# Patient Record
Sex: FEMALE | Race: WHITE | Employment: UNEMPLOYED | URBAN - METROPOLITAN AREA
[De-identification: names, ages, dates, MRNs, and addresses within clinical notes are randomized per-mention and may not be internally consistent; named-entity substitution may affect disease eponyms.]

---

## 2017-05-09 ENCOUNTER — OFFICE VISIT (OUTPATIENT)
Dept: FAMILY MEDICINE CLINIC | Age: 42
End: 2017-05-09

## 2017-05-09 VITALS
TEMPERATURE: 98 F | HEIGHT: 64 IN | HEART RATE: 83 BPM | BODY MASS INDEX: 27.01 KG/M2 | SYSTOLIC BLOOD PRESSURE: 110 MMHG | WEIGHT: 158.2 LBS | RESPIRATION RATE: 16 BRPM | DIASTOLIC BLOOD PRESSURE: 74 MMHG | OXYGEN SATURATION: 97 %

## 2017-05-09 DIAGNOSIS — R73.01 IMPAIRED FASTING BLOOD SUGAR: ICD-10-CM

## 2017-05-09 DIAGNOSIS — E04.1 THYROID CYST: ICD-10-CM

## 2017-05-09 DIAGNOSIS — E04.9 ENLARGED THYROID GLAND: ICD-10-CM

## 2017-05-09 DIAGNOSIS — F41.9 ANXIETY: Primary | ICD-10-CM

## 2017-05-09 DIAGNOSIS — Z13.220 SCREENING FOR HYPERLIPIDEMIA: ICD-10-CM

## 2017-05-09 RX ORDER — ALPRAZOLAM 0.25 MG/1
TABLET ORAL
Qty: 10 TAB | Refills: 0 | Status: SHIPPED | OUTPATIENT
Start: 2017-05-09 | End: 2018-02-08

## 2017-05-09 NOTE — MR AVS SNAPSHOT
Visit Information Date & Time Provider Department Dept. Phone Encounter #  
 5/9/2017  8:45 AM Benson Sal, [address] [id number] Follow-up Instructions Return in about 6 months (around 11/9/2017). Your Appointments 6/14/2017 10:00 AM  
ROUTINE CARE with Benson Sal MD  
[facility] ([address]) Appt Note: 6 mo fu  
 [address] [facility] [address]  
[address] [facility] [place] [address] [facility] [address] Upcoming Health Maintenance Date Due INFLUENZA AGE 9 TO ADULT 8/1/2017 PAP AKA CERVICAL CYTOLOGY 12/1/2018 DTaP/Tdap/Td series (2 - Td) 10/4/2023 Allergies as of 5/9/2017  Review Complete On: 5/9/2017 By: Benson Sal MD  
 No Known Allergies Current Immunizations  Reviewed on 12/7/2016 Name Date Influenza Vaccine (Quad) PF 12/7/2016 TB Skin Test (PPD) Intradermal 10/4/2013 10:43 AM  
 Tdap 10/4/2013 Not reviewed this visit You Were Diagnosed With   
  
 Codes Comments Anxiety    -  Primary ICD-10-CM: F41.9 ICD-9-CM: 300.00 Thyroid cyst     ICD-10-CM: E04.1 ICD-9-CM: 246.2 Enlarged thyroid gland     ICD-10-CM: E01.0 ICD-9-CM: 240.9 Impaired fasting blood sugar     ICD-10-CM: R73.01 
ICD-9-CM: 790.21 Screening for hyperlipidemia     ICD-10-CM: Z13.220 ICD-9-CM: V77.91 Vitals BP Pulse Temp Resp Height(growth percentile) Weight(growth percentile) 110/74 (BP 1 Location: Left arm, BP Patient Position: Sitting) 83 98 °F (36.7 °C) (Oral) 16 5' 4\" (1.626 m) 158 lb 3.2 oz (71.8 kg) SpO2 BMI OB Status Smoking Status 97% 27.15 kg/m2 Ablation Never Smoker Vitals History BMI and BSA Data Body Mass Index Body Surface Area  
 27.15 kg/m 2 1.8 m 2 Preferred Pharmacy Pharmacy Name Phone  [phone] - [place] [address] [address] [facility] [address] [facility] Your Updated Medication List  
  
   
This list is accurate as of: 17  9:27 AM.  Always use your most recent med list.  
  
  
  
  
 ALPRAZolam 0.25 mg tablet Commonly known as:  XANAX  
1 tab 15-30 minutes before flying. cetirizine 10 mg tablet Commonly known as:  ZYRTEC Take 10 mg by mouth daily as needed for Allergies. ibuprofen 200 mg tablet Commonly known as:  MOTRIN Take  by mouth as needed for Pain. Prescriptions Printed Refills ALPRAZolam (XANAX) 0.25 mg tablet 0 Si tab 15-30 minutes before flying. Class: Print Follow-up Instructions Return in about 6 months (around 2017). To-Do List   
 2017 Lab:  HEMOGLOBIN A1C WITH EAG   
  
 2017 Lab:  LIPID PANEL   
  
 2017 Lab:  T4, FREE   
  
 2017 Lab:  THYROID ANTIBODY PANEL   
  
 2017 Lab:  TSH 3RD GENERATION   
  
 2017 Imaging:  US THYROID/PARATHYROID/SOFT TISS Patient Instructions Anxiety Disorder: Care Instructions Your Care Instructions Anxiety is a normal reaction to stress. Difficult situations can cause you to have symptoms such as sweaty palms and a nervous feeling. In an anxiety disorder, the symptoms are far more severe. Constant worry, muscle tension, trouble sleeping, nausea and diarrhea, and other symptoms can make normal daily activities difficult or impossible. These symptoms may occur for no reason, and they can affect your work, school, or social life. Medicines, counseling, and self-care can all help. Follow-up care is a key part of your treatment and safety. Be sure to make and go to all appointments, and call your doctor if you are having problems. It's also a good idea to know your test results and keep a list of the medicines you take. How can you care for yourself at home? · Take medicines exactly as directed. Call your doctor if you think you are having a problem with your medicine. · Go to your counseling sessions and follow-up appointments. · Recognize and accept your anxiety. Then, when you are in a situation that makes you anxious, say to yourself, \"This is not an emergency. I feel uncomfortable, but I am not in danger. I can keep going even if I feel anxious. \" · Be kind to your body: ¨ Relieve tension with exercise or a massage. ¨ Get enough rest. 
¨ Avoid alcohol, caffeine, nicotine, and illegal drugs. They can increase your anxiety level and cause sleep problems. ¨ Learn and do relaxation techniques. See below for more about these techniques. · Engage your mind. Get out and do something you enjoy. Go to a funny movie, or take a walk or hike. Plan your day. Having too much or too little to do can make you anxious. · Keep a record of your symptoms. Discuss your fears with a good friend or family member, or join a support group for people with similar problems. Talking to others sometimes relieves stress. · Get involved in social groups, or volunteer to help others. Being alone sometimes makes things seem worse than they are. · Get at least 30 minutes of exercise on most days of the week to relieve stress. Walking is a good choice. You also may want to do other activities, such as running, swimming, cycling, or playing tennis or team sports. Relaxation techniques Do relaxation exercises 10 to 20 minutes a day. You can play soothing, relaxing music while you do them, if you wish. · Tell others in your house that you are going to do your relaxation exercises. Ask them not to disturb you. · Find a comfortable place, away from all distractions and noise. · Lie down on your back, or sit with your back straight. · Focus on your breathing. Make it slow and steady. · Breathe in through your nose. Breathe out through either your nose or mouth. · Breathe deeply, filling up the area between your navel and your rib cage. Breathe so that your belly goes up and down. · Do not hold your breath. · Breathe like this for 5 to 10 minutes. Notice the feeling of calmness throughout your whole body. As you continue to breathe slowly and deeply, relax by doing the following for another 5 to 10 minutes: · Tighten and relax each muscle group in your body. You can begin at your toes and work your way up to your head. · Imagine your muscle groups relaxing and becoming heavy. · Empty your mind of all thoughts. · Let yourself relax more and more deeply. · Become aware of the state of calmness that surrounds you. · When your relaxation time is over, you can bring yourself back to alertness by moving your fingers and toes and then your hands and feet and then stretching and moving your entire body. Sometimes people fall asleep during relaxation, but they usually wake up shortly afterward. · Always give yourself time to return to full alertness before you drive a car or do anything that might cause an accident if you are not fully alert. Never play a relaxation tape while you drive a car. When should you call for help? Call 911 anytime you think you may need emergency care. For example, call if: 
· You feel you cannot stop from hurting yourself or someone else. Keep the numbers for these national suicide hotlines: 8-459-101-TALK (0-919-049-151.637.2401) and 6-329-DKWCVMH (0-307.414.3425). If you or someone you know talks about suicide or feeling hopeless, get help right away. Watch closely for changes in your health, and be sure to contact your doctor if: 
· You have anxiety or fear that affects your life. · You have symptoms of anxiety that are new or different from those you had before. Where can you learn more? Go to http://patricia-caity.info/. Enter P754 in the search box to learn more about \"Anxiety Disorder: Care Instructions. \" 
 Current as of: July 26, 2016 Content Version: 11.2 © 3170-6412 [employer]. Care instructions adapted under license by [employer] (which disclaims liability or warranty for this information). If you have questions about a medical condition or this instruction, always ask your healthcare professional. [address] any warranty or liability for your use of this information. Learning About High Cholesterol What is high cholesterol? Cholesterol is a type of fat in your blood. It is needed for many body functions, such as making new cells. Cholesterol is made by your body. It also comes from food you eat. If you have too much cholesterol, it starts to build up in your arteries. This is called hardening of the arteries, or atherosclerosis. High cholesterol raises your risk of a heart attack and stroke. There are different types of cholesterol. LDL is the \"bad\" cholesterol. High LDL can raise your risk for heart disease, heart attack, and stroke. HDL is the \"good\" cholesterol. High HDL is linked with a lower risk for heart disease, heart attack, and stroke. Your cholesterol levels help your doctor find out your risk for having a heart attack or stroke. How can you prevent high cholesterol? A heart-healthy lifestyle can help you prevent high cholesterol. This lifestyle helps lower your risk for a heart attack and stroke. · Eat heart-healthy foods. ¨ Eat fruits, vegetables, whole grains (like oatmeal), dried beans and peas, nuts and seeds, soy products (like tofu), and fat-free or low-fat dairy products. ¨ Replace butter, margarine, and hydrogenated or partially hydrogenated oils with olive and canola oils. (Canola oil margarine without trans fat is fine.) ¨ Replace red meat with fish, poultry, and soy protein (like tofu). ¨ Limit processed and packaged foods like chips, crackers, and cookies. · Be active. Exercise can improve your cholesterol level. Get at least 30 minutes of exercise on most days of the week. Walking is a good choice. You also may want to do other activities, such as running, swimming, cycling, or playing tennis or team sports. · Stay at a healthy weight. Lose weight if you need to. · Don't smoke. If you need help quitting, talk to your doctor about stop-smoking programs and medicines. These can increase your chances of quitting for good. How is high cholesterol treated? The goal of treatment is to reduce your chances of having a heart attack or stroke. The goal is not to lower your cholesterol numbers only. · You may make lifestyle changes, such as eating healthy foods, not smoking, losing weight, and being more active. · You may have to take medicine. Follow-up care is a key part of your treatment and safety. Be sure to make and go to all appointments, and call your doctor if you are having problems. It's also a good idea to know your test results and keep a list of the medicines you take. Where can you learn more? Go to http://patricia-caity.info/. Enter Z487 in the search box to learn more about \"Learning About High Cholesterol. \" Current as of: January 27, 2016 Content Version: 11.2 © 5450-7235 [employer]. Care instructions adapted under license by [employer] (which disclaims liability or warranty for this information). If you have questions about a medical condition or this instruction, always ask your healthcare professional. [place] [address] any warranty or liability for your use of this information. Goiter: Care Instructions Your Care Instructions A goiter is an enlarged thyroid gland. It can cause swelling in your neck. Your thyroid is found in the front of your neck. It makes a hormone that controls how your body uses energy. Goiters are caused by different things. Some are caused by high or low levels of thyroid hormone. Others are caused by too little iodine in the diet, or a growth or disease in the thyroid. In the [place], most goiters are caused by long-term autoimmune thyroiditis. This is also called Hashimoto's thyroiditis. It happens when the body's immune system damages the thyroid. You may take thyroid hormone to reduce the size of your goiter. Or you may need surgery or radioactive iodine treatment. Some people don't need any treatment. They only need to watch for changes in the goiter. Follow-up care is a key part of your treatment and safety. Be sure to make and go to all appointments, and call your doctor if you are having problems. It's also a good idea to know your test results and keep a list of the medicines you take. How can you care for yourself at home? · Be safe with medicines. Take your medicines exactly as prescribed. Call your doctor if you think you are having a problem with your medicine. You will get more details on the specific medicines your doctor prescribes. When should you call for help? Call 911 anytime you think you may need emergency care. For example, call if: 
· You have trouble breathing. Watch closely for changes in your health, and be sure to contact your doctor if: 
· Your eyes turn red and bulge. · You have trouble swallowing. · You feel very tired or weak. · You lose weight but are eating the same or more than usual. 
Where can you learn more? Go to http://patricia-caity.info/. Enter V701 in the search box to learn more about \"Goiter: Care Instructions. \" Current as of: July 28, 2016 Content Version: 11.2 © 0977-6392 [employer]. Care instructions adapted under license by [employer] (which disclaims liability or warranty for this information).  If you have questions about a medical condition or this instruction, always ask your healthcare professional. [address] any warranty or liability for your use of this information. Introducing \[facility]\"" & HEALTH SERVICES! Alireza Hercules introduces PolySpot patient portal. Now you can access parts of your medical record, email your doctor's office, and request medication refills online. 1. In your internet browser, go to [email or web address] 2. Click on the First Time User? Click Here link in the Sign In box. You will see the New Member Sign Up page. 3. Enter your PolySpot Access Code exactly as it appears below. You will not need to use this code after youve completed the sign-up process. If you do not sign up before the expiration date, you must request a new code. · PolySpot Access Code: [email or web address] Expires: 8/7/2017  9:08 AM 
 
4. Enter the last four digits of your Social Security Number (xxxx) and Date of Birth (mm/dd/yyyy) as indicated and click Submit. You will be taken to the next sign-up page. 5. Create a PolySpot ID. This will be your PolySpot login ID and cannot be changed, so think of one that is secure and easy to remember. 6. Create a PolySpot password. You can change your password at any time. 7. Enter your Password Reset Question and Answer. This can be used at a later time if you forget your password. 8. Enter your e-mail address. You will receive e-mail notification when new information is available in [address] 9. Click Sign Up. You can now view and download portions of your medical record. 10. Click the Download Summary menu link to download a portable copy of your medical information. If you have questions, please visit the Frequently Asked Questions section of the PolySpot website. Remember, PolySpot is NOT to be used for urgent needs. For medical emergencies, dial 911. Now available from your iPhone and Android! Please provide this summary of care documentation to your next provider. Your primary care clinician is listed as Jacinda Merino. If you have any questions after today's visit, please call [phone].

## 2017-05-09 NOTE — PATIENT INSTRUCTIONS
Anxiety Disorder: Care Instructions  Your Care Instructions  Anxiety is a normal reaction to stress. Difficult situations can cause you to have symptoms such as sweaty palms and a nervous feeling. In an anxiety disorder, the symptoms are far more severe. Constant worry, muscle tension, trouble sleeping, nausea and diarrhea, and other symptoms can make normal daily activities difficult or impossible. These symptoms may occur for no reason, and they can affect your work, school, or social life. Medicines, counseling, and self-care can all help. Follow-up care is a key part of your treatment and safety. Be sure to make and go to all appointments, and call your doctor if you are having problems. It's also a good idea to know your test results and keep a list of the medicines you take. How can you care for yourself at home? · Take medicines exactly as directed. Call your doctor if you think you are having a problem with your medicine. · Go to your counseling sessions and follow-up appointments. · Recognize and accept your anxiety. Then, when you are in a situation that makes you anxious, say to yourself, \"This is not an emergency. I feel uncomfortable, but I am not in danger. I can keep going even if I feel anxious. \"  · Be kind to your body:  ¨ Relieve tension with exercise or a massage. ¨ Get enough rest.  ¨ Avoid alcohol, caffeine, nicotine, and illegal drugs. They can increase your anxiety level and cause sleep problems. ¨ Learn and do relaxation techniques. See below for more about these techniques. · Engage your mind. Get out and do something you enjoy. Go to a funny movie, or take a walk or hike. Plan your day. Having too much or too little to do can make you anxious. · Keep a record of your symptoms. Discuss your fears with a good friend or family member, or join a support group for people with similar problems. Talking to others sometimes relieves stress.   · Get involved in social groups, or volunteer to help others. Being alone sometimes makes things seem worse than they are. · Get at least 30 minutes of exercise on most days of the week to relieve stress. Walking is a good choice. You also may want to do other activities, such as running, swimming, cycling, or playing tennis or team sports. Relaxation techniques  Do relaxation exercises 10 to 20 minutes a day. You can play soothing, relaxing music while you do them, if you wish. · Tell others in your house that you are going to do your relaxation exercises. Ask them not to disturb you. · Find a comfortable place, away from all distractions and noise. · Lie down on your back, or sit with your back straight. · Focus on your breathing. Make it slow and steady. · Breathe in through your nose. Breathe out through either your nose or mouth. · Breathe deeply, filling up the area between your navel and your rib cage. Breathe so that your belly goes up and down. · Do not hold your breath. · Breathe like this for 5 to 10 minutes. Notice the feeling of calmness throughout your whole body. As you continue to breathe slowly and deeply, relax by doing the following for another 5 to 10 minutes:  · Tighten and relax each muscle group in your body. You can begin at your toes and work your way up to your head. · Imagine your muscle groups relaxing and becoming heavy. · Empty your mind of all thoughts. · Let yourself relax more and more deeply. · Become aware of the state of calmness that surrounds you. · When your relaxation time is over, you can bring yourself back to alertness by moving your fingers and toes and then your hands and feet and then stretching and moving your entire body. Sometimes people fall asleep during relaxation, but they usually wake up shortly afterward. · Always give yourself time to return to full alertness before you drive a car or do anything that might cause an accident if you are not fully alert.  Never play a relaxation tape while you drive a car. When should you call for help? Call 911 anytime you think you may need emergency care. For example, call if:  · You feel you cannot stop from hurting yourself or someone else. Keep the numbers for these national suicide hotlines: 1-162-833-TALK (5-119.699.5511) and 4-233-MOPSXNR (2-834.714.6710). If you or someone you know talks about suicide or feeling hopeless, get help right away. Watch closely for changes in your health, and be sure to contact your doctor if:  · You have anxiety or fear that affects your life. · You have symptoms of anxiety that are new or different from those you had before. Where can you learn more? Go to http://patricia-caity.info/. Enter P754 in the search box to learn more about \"Anxiety Disorder: Care Instructions. \"  Current as of: July 26, 2016  Content Version: 11.2  © 6952-6599 [employer]. Care instructions adapted under license by [employer] (which disclaims liability or warranty for this information). If you have questions about a medical condition or this instruction, always ask your healthcare professional. [address] any warranty or liability for your use of this information. Learning About High Cholesterol  What is high cholesterol? Cholesterol is a type of fat in your blood. It is needed for many body functions, such as making new cells. Cholesterol is made by your body. It also comes from food you eat. If you have too much cholesterol, it starts to build up in your arteries. This is called hardening of the arteries, or atherosclerosis. High cholesterol raises your risk of a heart attack and stroke. There are different types of cholesterol. LDL is the \"bad\" cholesterol. High LDL can raise your risk for heart disease, heart attack, and stroke. HDL is the \"good\" cholesterol. High HDL is linked with a lower risk for heart disease, heart attack, and stroke.   Your cholesterol levels help your doctor find out your risk for having a heart attack or stroke. How can you prevent high cholesterol? A heart-healthy lifestyle can help you prevent high cholesterol. This lifestyle helps lower your risk for a heart attack and stroke. · Eat heart-healthy foods. ¨ Eat fruits, vegetables, whole grains (like oatmeal), dried beans and peas, nuts and seeds, soy products (like tofu), and fat-free or low-fat dairy products. ¨ Replace butter, margarine, and hydrogenated or partially hydrogenated oils with olive and canola oils. (Canola oil margarine without trans fat is fine.)  ¨ Replace red meat with fish, poultry, and soy protein (like tofu). ¨ Limit processed and packaged foods like chips, crackers, and cookies. · Be active. Exercise can improve your cholesterol level. Get at least 30 minutes of exercise on most days of the week. Walking is a good choice. You also may want to do other activities, such as running, swimming, cycling, or playing tennis or team sports. · Stay at a healthy weight. Lose weight if you need to. · Don't smoke. If you need help quitting, talk to your doctor about stop-smoking programs and medicines. These can increase your chances of quitting for good. How is high cholesterol treated? The goal of treatment is to reduce your chances of having a heart attack or stroke. The goal is not to lower your cholesterol numbers only. · You may make lifestyle changes, such as eating healthy foods, not smoking, losing weight, and being more active. · You may have to take medicine. Follow-up care is a key part of your treatment and safety. Be sure to make and go to all appointments, and call your doctor if you are having problems. It's also a good idea to know your test results and keep a list of the medicines you take. Where can you learn more? Go to http://patricia-caity.info/.   Enter M672 in the search box to learn more about \"Learning About High Cholesterol. \"  Current as of: January 27, 2016  Content Version: 11.2  © 4854-0159 Done [employer] :[employer]. Care instructions adapted under license by [employer] (which disclaims liability or warranty for this information). If you have questions about a medical condition or this instruction, always ask your healthcare professional. [address] any warranty or liability for your use of this information. Goiter: Care Instructions  Your Care Instructions    A goiter is an enlarged thyroid gland. It can cause swelling in your neck. Your thyroid is found in the front of your neck. It makes a hormone that controls how your body uses energy. Goiters are caused by different things. Some are caused by high or low levels of thyroid hormone. Others are caused by too little iodine in the diet, or a growth or disease in the thyroid. In the [place], most goiters are caused by long-term autoimmune thyroiditis. This is also called Hashimoto's thyroiditis. It happens when the body's immune system damages the thyroid. You may take thyroid hormone to reduce the size of your goiter. Or you may need surgery or radioactive iodine treatment. Some people don't need any treatment. They only need to watch for changes in the goiter. Follow-up care is a key part of your treatment and safety. Be sure to make and go to all appointments, and call your doctor if you are having problems. It's also a good idea to know your test results and keep a list of the medicines you take. How can you care for yourself at home? · Be safe with medicines. Take your medicines exactly as prescribed. Call your doctor if you think you are having a problem with your medicine. You will get more details on the specific medicines your doctor prescribes. When should you call for help? Call 911 anytime you think you may need emergency care. For example, call if:  · You have trouble breathing.   Watch closely for changes in your health, and be sure to contact your doctor if:  · Your eyes turn red and bulge. · You have trouble swallowing. · You feel very tired or weak. · You lose weight but are eating the same or more than usual.  Where can you learn more? Go to http://patricia-caity.info/. Enter C016 in the search box to learn more about \"Goiter: Care Instructions. \"  Current as of: July 28, 2016  Content Version: 11.2  © 4136-3329 [employer]. Care instructions adapted under license by [employer] (which disclaims liability or warranty for this information). If you have questions about a medical condition or this instruction, always ask your healthcare professional. [address] any warranty or liability for your use of this information.

## 2017-05-09 NOTE — PROGRESS NOTES
1. Have you been to the ER, urgent care clinic since your last visit? Hospitalized since your last visit? No    2. Have you seen or consulted any other health care providers outside of the [address] [place] [address] [place] since your last visit? Include any pap smears or colon screening. No    Patient is going to [facility] this summer and wants to discuss getting Rx for Xanax for flight.

## 2017-05-09 NOTE — PROGRESS NOTES
HISTORY OF PRESENT ILLNESS  Alyce Sow is a 37 y.o. female. HPI: Here with concern of needed medication regarding flying anxiety. She had a bad experience once with landing and since then she is feeling anxious with thought of flying. Going out of country in summer with family for a vacation trip and was wondering if she can have something to help her anxiety. Routinely no depression or anxiety. No concern. Has h/o thyroid cyst and palpable enlarge thyroid gland on exam. Has puffy neck and per her since young age she has notice it is like that. During allergy season she feels it more puffy and sometimes discomfort. Currently no trouble swallowing or any trouble breathing. No heat or cold intolerance. No urine or bowel habit change. No weight or appetite changes. No unusual fatigue. Very active. Does routine exercise. Visit Vitals    /74 (BP 1 Location: Left arm, BP Patient Position: Sitting)    Pulse 83    Temp 98 °F (36.7 °C) (Oral)    Resp 16    Ht 5' 4\" (1.626 m)    Wt 158 lb 3.2 oz (71.8 kg)    SpO2 97%    BMI 27.15 kg/m2     Review medication list, vitals, problem list,allergies. Denies any headache or dizziness, no chest pain or sob. No palpitation. ROS: see HPI     Physical Exam   Constitutional: She is oriented to person, place, and time. No distress. Neck: Thyromegaly present. Cardiovascular: Normal heart sounds. Pulmonary/Chest: No respiratory distress. She has no wheezes. Abdominal: There is no tenderness. Musculoskeletal: She exhibits no edema. Neurological: She is oriented to person, place, and time. Psychiatric: Her behavior is normal.       ASSESSMENT and PLAN    ICD-10-CM ICD-9-CM    1. Anxiety/ with flying : giving xanax for flying anxiety. Discussed medication side effects. Advise to take it as needed and avoid driving after taking medication as it can make her feel sleepy. F41.9 300.00 ALPRAZolam (XANAX) 0.25 mg tablet   2.  Thyroid cyst: per ultrasound. Now will recheck labs and repeat ultrasound. E04.1 246.2 US THYROID/PARATHYROID/SOFT TISS   3. Enlarged thyroid gland E01.0 240.9 THYROID ANTIBODY PANEL      TSH 3RD GENERATION      T4, FREE      US THYROID/PARATHYROID/SOFT TISS   4. Impaired fasting blood sugar: checking labs. On diet modification . R73.01 790.21 HEMOGLOBIN A1C WITH EAG   5. Screening for hyperlipidemia Z13.220 V77.91 LIPID PANEL   Pt understood and agrees with above plan. Review HM   Follow-up Disposition:  Return in about 6 months (around 11/9/2017).

## 2017-05-19 ENCOUNTER — HOSPITAL ENCOUNTER (OUTPATIENT)
Dept: ULTRASOUND IMAGING | Age: 42
Discharge: HOME | End: 2017-05-19
Attending: FAMILY MEDICINE
Payer: COMMERCIAL

## 2017-05-19 DIAGNOSIS — E04.9 ENLARGED THYROID GLAND: ICD-10-CM

## 2017-05-19 DIAGNOSIS — E04.1 THYROID CYST: ICD-10-CM

## 2017-05-19 PROCEDURE — 76536 US EXAM OF HEAD AND NECK: CPT

## 2017-05-23 NOTE — PROGRESS NOTES
Spoke with patient (2 verifiers name/) regarding thyroid ultrasound noted thyroid cyst bilaterally and little increase in size compare to prior ultrasound. Patient informed Dr Nguyen Dunn would like to refer her to endocrinology for further evaluation and might need to consider aspiration. Patient stated she will like to talk it over with her  and will contact Dr Nguyen Dunn with her decision. Patient informed Dr Nguyen Dunn will be given the message. Patient voiced understanding.

## 2017-05-23 NOTE — PROGRESS NOTES
Let pt know that thyroid ultrasound noted thyroid cyst bilaterally and little increase in size compare to prior ultrasound. Again please ask if she would go to endocrinologist for further evaluation and might need to  Consider aspiration. Thanks.

## 2018-01-28 LAB — PAP SMEAR, EXTERNAL: NORMAL

## 2018-02-08 ENCOUNTER — OFFICE VISIT (OUTPATIENT)
Dept: FAMILY MEDICINE CLINIC | Age: 43
End: 2018-02-08

## 2018-02-08 VITALS
TEMPERATURE: 97.9 F | SYSTOLIC BLOOD PRESSURE: 110 MMHG | WEIGHT: 154 LBS | HEIGHT: 64 IN | HEART RATE: 72 BPM | RESPIRATION RATE: 12 BRPM | DIASTOLIC BLOOD PRESSURE: 70 MMHG | BODY MASS INDEX: 26.29 KG/M2 | OXYGEN SATURATION: 99 %

## 2018-02-08 DIAGNOSIS — Z91.89 AT HIGH RISK FOR BREAST CANCER: ICD-10-CM

## 2018-02-08 DIAGNOSIS — J30.1 SEASONAL ALLERGIC RHINITIS DUE TO POLLEN, UNSPECIFIED CHRONICITY: Primary | ICD-10-CM

## 2018-02-08 DIAGNOSIS — Z80.3 FAMILY HISTORY OF BREAST CANCER IN MOTHER: ICD-10-CM

## 2018-02-08 NOTE — PROGRESS NOTES
Maricruz Thomas is a 40 y.o. female  Previously seen elsewhere within our medical group. Establishing care with me today. Previously evaluated by Dr. Stephan Streeter for fam hx breast cancer in her mother, which included biopsy. \"She told me not to come back unless there was a problem. \" Returned to Dr. Kaden Miller. Annual mammograms. MRI not currently in her surveillance plan. Most recent studies done [facility]. Not available for review. Health Maintenance   Topic Date Due    PAP AKA CERVICAL CYTOLOGY  12/01/2018    DTaP/Tdap/Td series (2 - Td) 10/04/2023    Influenza Age 5 to Adult  Completed     [facility] Results (most recent):    Results from [place] encounter on 12/16/15   [facility] MAMMO BI DX DIGTL   Narrative DIGITAL DIAGNOSTIC MAMMOGRAM - BILATERAL  LIMITED BREAST ULTRASOUND - BILATERAL    INDICATION:  Palpable lump in the right breast felt by the clinician. COMPARISON:  [id number]      MAMMOGRAM TECHNIQUE/FINDINGS: Digital mammography was performed of both breasts  with CAD. Routine views and right ML view were performed. No suspicious mass or suspicious microcalcifications are seen. Asymmetric tissue  density noted at the axillary regions bilaterally. Breast density category D.: The breasts are extremely dense, which lowers the  sensitivity of mammography. ULTRASOUND FINDINGS: Ultrasound evaluation was performed of the upper right  breast in the area of palpable concern as described on the physician's order. Ultrasound evaluation was also performed with bilateral axillary regions.  -Heterogeneous background echotexture noted with dense glandular tissue. In the  area of palpable concern at the upper right breast, dense fibroglandular tissue  noted. No suspicious mass was seen.  -No suspicious masses seen in bilateral axilla. Accessory dense fibroglandular  tissue noted in both axilla, left greater than right.            Impression IMPRESSION:   No mammographic or sonographic evidence for malignancy.    -Suggest routine followup.    -The patient was asked to examine the area of concern on a monthly basis and to  return sooner if any changes occur. BIRADS 2:  Benign        Chart review: Yady Marshall, breast navigator Dec 2015: \"Met with Ms. Arnulfo Nagy and her daughter after her breast diagnostic studies today to evaluate if she is at high risk for cancer. Her mother had breast cancer at 52. No other significant family history for cancer. \"Briefly reviewed high risk cancer gene mutations and risk reduction strategies. Breast Cancer Risk Model estimated her lifetime risk of breast cancer at 26.5%. Advised her that she would be eligible for increased surveillance to monitor her breast health.     \"Provided written information. Ms. Yolanda Hatch mother sees Dr. Maryfrances Bernheim for her breast cancer follow-up care. They prefer to follow-up with her for possible genetic testing and high risk cancer evaluation. \"    Seems to recall being specifically told that MRI not indicated in her. The reports were not available for review at the time of our visit. Episodic trouble swallowing, starting 2 years ago. Thyroid US with cysts. No intervention. symptoms resolved, returned a year later. Repeat US \"slightly larger. \"  Declined ENT referral. Seems seasonal (spring). Has mild seasonal AR symptoms as well. (not currently)    US Results (most recent):    Results from [place] encounter on 05/19/17   US THYROID/PARATHYROID/SOFT TISS   Narrative THYROID ULTRASOUND:    INDICATION: Follow-up of thyroid cyst    COMPARISON: Thyroid ultrasound 5/16/2016    TECHNIQUE: Real-time ultrasonography of the thyroid gland was performed and  selected sagittal and transverse images submitted for review. FINDINGS:   The right thyroid lobe measures 4.3 x 2.4 x 1.2 cm.  -Mid to upper pole right thyroid cyst measures 4 x 3 x 2 mm.   -Mid pole right thyroid cyst measures 6 x 5 x 4 mm (previously 6 x 4 x 3 mm). The left thyroid lobe measures 4.8 x 1.9 x 1.4 cm.  -Mid pole lateral left thyroid cyst measures 4 x 4 by 2 mm (previously 3 x 2 x 3  mm). -Mid pole anterior cyst measures 2 x 2 by 2 mm. The isthmus measures 0.26 cm in thickness. Impression IMPRESSION:   Subcentimeter bilateral thyroid cysts, as above. Lab Results   Component Value Date/Time    TSH 1.18 2016 09:03 AM         Patient Care Team:  Ro Ochoa MD as PCP - General (Family Practice)  Chayo Anguiano RN as Nurse Sole Martinez MD (Surgery)  Estelle Song MD (Obstetrics & Gynecology)  No Known Allergies    Patient Active Problem List    Diagnosis    Family history of breast cancer in mother    At high risk for breast cancer     Dec 2015: Breast Cancer Risk Model: lifetime risk of breast cancer at 26.5%.  Menstrual abnormality/ s/p ablation     S/P left breast biopsy     History reviewed. No pertinent past medical history. Past Surgical History:   Procedure Laterality Date    HX APPENDECTOMY  26    HX  SECTION       Family History   Problem Relation Age of Onset   Gennaro Lima Breast Cancer Mother 52     gene negative     Social History     Social History    Marital status:      Spouse name: N/A    Number of children: N/A    Years of education: N/A     Occupational History    Not on file. Social History Main Topics    Smoking status: Never Smoker    Smokeless tobacco: Never Used    Alcohol use Yes      Comment: occasionally    Drug use: No    Sexual activity: Yes     Partners: Male     Other Topics Concern    Not on file     Social History Narrative         Review of Systems   Respiratory: Negative for shortness of breath and wheezing.     Genitourinary:        No breast mass/pain/skin changes/discharge       Visit Vitals    /70 (BP 1 Location: Left arm, BP Patient Position: Sitting)    Pulse 72    Temp 97.9 °F (36.6 °C) (Oral)    Resp 12    Ht 5' 4\" (1.626 m)    Wt 154 lb (69.9 kg)    SpO2 99%    BMI 26.43 kg/m2     Physical Exam   Constitutional: She is oriented to person, place, and time. She appears well-developed. No distress. Pleasant overweight white female   HENT:   Head: Normocephalic and atraumatic. Pulmonary/Chest: Effort normal.   Neurological: She is alert and oriented to person, place, and time. Psychiatric: She has a normal mood and affect. Her behavior is normal. Judgment and thought content normal.         ICD-10-CM ICD-9-CM    1. Seasonal allergic rhinitis due to pollen, unspecified chronicity J30.1 477.0    2. Family history of breast cancer in mother Z80.2 V16.3    3. At high risk for breast cancer Z91.89 V49.89      Seasonal nature of swallowing issues supports likely relationship to AR. Benign thyroid imaging. Referral not currently indicated. Antihistamines as needed. Breast cancer risk: 26.5% per breast navigator notes. Mother BRCA 1/2 neg by history    Explored risks/benefits of increased surveillance - detection of disease early vs cumulative radiation, false positives. mammo 3d tomosynthesis seems prudent with historically dense breasts. Encouraged her to discuss further with Dr. Valerie Chen her views of staggering mammography/MRI q6 months. Will defer to her expertise possible role of chemoprophylaxis to her as well, which was not discussed today. Forwarding and requesting records. The patient understands our medical plan. Alternatives have been explained and offered    FU prn     Over 50% of the 27 minutes face to face with Jaime Rasheed consisted of counseling and/or discussing treatment plans.

## 2018-02-08 NOTE — PATIENT INSTRUCTIONS
Look for Safia Millard risk scoring assessment in your mammogram report. 20% or higher lifetime risk is considered high.

## 2018-05-02 LAB — MAMMOGRAPHY, EXTERNAL: NORMAL

## 2020-07-27 LAB — MAMMOGRAPHY, EXTERNAL: NORMAL

## 2021-01-22 ENCOUNTER — OFFICE VISIT (OUTPATIENT)
Dept: FAMILY MEDICINE CLINIC | Age: 46
End: 2021-01-22
Payer: COMMERCIAL

## 2021-01-22 ENCOUNTER — HOSPITAL ENCOUNTER (OUTPATIENT)
Dept: LAB | Age: 46
Discharge: HOME | End: 2021-01-22
Payer: COMMERCIAL

## 2021-01-22 VITALS
DIASTOLIC BLOOD PRESSURE: 76 MMHG | OXYGEN SATURATION: 98 % | TEMPERATURE: 97.9 F | WEIGHT: 160 LBS | HEART RATE: 80 BPM | BODY MASS INDEX: 27.46 KG/M2 | SYSTOLIC BLOOD PRESSURE: 118 MMHG | RESPIRATION RATE: 16 BRPM

## 2021-01-22 DIAGNOSIS — Z13.220 SCREENING, LIPID: ICD-10-CM

## 2021-01-22 DIAGNOSIS — Z13.1 SCREENING FOR DIABETES MELLITUS: ICD-10-CM

## 2021-01-22 DIAGNOSIS — Z01.419 WELL WOMAN EXAM: Primary | ICD-10-CM

## 2021-01-22 PROBLEM — N60.29 FIBROADENOSIS OF BREAST: Status: ACTIVE | Noted: 2017-05-18

## 2021-01-22 LAB
ANION GAP SERPL CALC-SCNC: 8 MMOL/L (ref 3–18)
BUN SERPL-MCNC: 16 MG/DL (ref 7–18)
BUN/CREAT SERPL: 19 (ref 12–20)
CALCIUM SERPL-MCNC: 9.2 MG/DL (ref 8.5–10.1)
CHLORIDE SERPL-SCNC: 105 MMOL/L (ref 100–111)
CHOLEST SERPL-MCNC: 220 MG/DL
CO2 SERPL-SCNC: 28 MMOL/L (ref 21–32)
CREAT SERPL-MCNC: 0.84 MG/DL (ref 0.6–1.3)
EST. AVERAGE GLUCOSE BLD GHB EST-MCNC: 103 MG/DL
GLUCOSE SERPL-MCNC: 101 MG/DL (ref 74–99)
HBA1C MFR BLD: 5.2 % (ref 4.2–5.6)
HDLC SERPL-MCNC: 51 MG/DL (ref 40–60)
HDLC SERPL: 4.3 {RATIO} (ref 0–5)
LDLC SERPL CALC-MCNC: 147.4 MG/DL (ref 0–100)
LIPID PROFILE,FLP: ABNORMAL
POTASSIUM SERPL-SCNC: 4 MMOL/L (ref 3.5–5.5)
SODIUM SERPL-SCNC: 141 MMOL/L (ref 136–145)
TRIGL SERPL-MCNC: 108 MG/DL (ref ?–150)
VLDLC SERPL CALC-MCNC: 21.6 MG/DL

## 2021-01-22 PROCEDURE — 99396 PREV VISIT EST AGE 40-64: CPT | Performed by: FAMILY MEDICINE

## 2021-01-22 PROCEDURE — 80048 BASIC METABOLIC PNL TOTAL CA: CPT

## 2021-01-22 PROCEDURE — 80061 LIPID PANEL: CPT

## 2021-01-22 PROCEDURE — 83036 HEMOGLOBIN GLYCOSYLATED A1C: CPT

## 2021-01-22 NOTE — PROGRESS NOTES
Diamond Hickman is a 41 y.o. female here for Well Woman Exam    Assessment/Plan:     Diagnoses and all orders for this visit:    1. Well woman exam    2. Screening for diabetes mellitus  -     HEMOGLOBIN A1C WITH EAG; Future  -     METABOLIC PANEL, BASIC; Future    3. Screening, lipid  -     LIPID PANEL W/ REFLX DIRECT LDL; Future    Interested in beginning colon cancer screening age 40. Encouraged to discuss coverage with her insurance company. Follow-up and Dispositions    · Return in about 1 year (around 1/22/2022) for well woman, no pap, sooner as needed for other concerns. Subjective:   No acute concerns    Seen in Dr. Zambrano Sensor office for breast cancer risk: Dec 2015: Breast Cancer Risk Model: lifetime risk of breast cancer at 26.5%. 5/7/2018: JAMESON model 29%: clinical exam q6months, annual mmg, MRI  Most recent mmg 7/2020 visible in [facility] skin cancer screening    [facility] through Dr. Peewee Pillai    No LMP recorded. Patient has had an ablation.   Health Maintenance   Topic Date Due    Breast Cancer Screen Mammogram  05/02/2019    Lipid Screen  05/13/2021    PAP AKA CERVICAL CYTOLOGY  01/28/2023    DTaP/Tdap/Td series (2 - Td) 10/04/2023    Flu Vaccine  Completed    Pneumococcal 0-64 years  Aged Out       Lab Results   Component Value Date/Time    Sodium 139 05/13/2016 09:03 AM    Potassium 4.5 05/13/2016 09:03 AM    Chloride 101 05/13/2016 09:03 AM    CO2 26 05/13/2016 09:03 AM    Anion gap 12.0 05/13/2016 09:03 AM    Glucose 89 05/13/2016 09:03 AM    BUN 15 05/13/2016 09:03 AM    Creatinine 0.7 05/13/2016 09:03 AM    Calcium 9.2 05/13/2016 09:03 AM     Lab Results   Component Value Date/Time    Cholesterol, total 203 (H) 05/13/2016 09:03 AM    HDL Cholesterol 61 (H) 05/13/2016 09:03 AM    LDL, calculated 132 (H) 05/13/2016 09:03 AM    VLDL, calculated 11 05/13/2016 09:03 AM    Triglyceride 53 05/13/2016 09:03 AM         The following sections were reviewed & updated as appropriate: PMH, PSH, FH, and SH.       ROS:  Feeling well. No dyspnea or chest pain on exertion. No abdominal pain, change in bowel habits, black or bloody stools. No neurological complaints. Objective:     Visit Vitals  /76 (BP 1 Location: Left arm, BP Patient Position: Sitting)   Pulse 80 Comment: recheck   Temp 97.9 °F (36.6 °C) (Oral)   Resp 16   Wt 160 lb (72.6 kg)   SpO2 98%   BMI 27.46 kg/m²        The patient appears well, alert, oriented x 3, in no distress. ENT normal.  Neck supple. No adenopathy or thyromegaly. Lungs are clear, good air entry, no wheezes, rhonchi or rales. S1 and S2 normal, no murmurs, regular rate and rhythm. Abdomen soft without tenderness, guarding, mass or organomegaly. Extremities show no edema. Neurological is without focal findings. Disclaimer: The patient understands our medical plan. Alternatives have been explained and offered. All questions have been addressed. She is encouraged to employ the information provided in the after visit summary, which was reviewed. She is instructed to call the clinic if she has not been notified either by phone or through [address] with the results of her tests or with an appointment plan for any referrals within 1 week(s). No news is not good news; it's no news. The patient  is to call if her condition worsens or fails to improve or if significant side effects are experienced. Aspects of this note may have been generated using voice recognition software. Despite editing, unrecognized errors may occur.        Yannick Joe MD

## 2021-01-22 NOTE — PATIENT INSTRUCTIONS
A Healthy Lifestyle: Care Instructions Your Care Instructions A healthy lifestyle can help you feel good, stay at a healthy weight, and have plenty of energy for both work and play. A healthy lifestyle is something you can share with your whole family. A healthy lifestyle also can lower your risk for serious health problems, such as high blood pressure, heart disease, and diabetes. You can follow a few steps listed below to improve your health and the health of your family. Follow-up care is a key part of your treatment and safety. Be sure to make and go to all appointments, and call your doctor if you are having problems. It's also a good idea to know your test results and keep a list of the medicines you take. How can you care for yourself at home? · Do not eat too much sugar, fat, or fast foods. You can still have dessert and treats now and then. The goal is moderation. · Start small to improve your eating habits. Pay attention to portion sizes, drink less juice and soda pop, and eat more fruits and vegetables. ? Eat a healthy amount of food. A 3-ounce serving of meat, for example, is about the size of a deck of cards. Fill the rest of your plate with vegetables and whole grains. ? Limit the amount of soda and sports drinks you have every day. Drink more water when you are thirsty. ? Eat at least 5 servings of fruits and vegetables every day. It may seem like a lot, but it is not hard to reach this goal. A serving or helping is 1 piece of fruit, 1 cup of vegetables, or 2 cups of leafy, raw vegetables. Have an apple or some carrot sticks as an afternoon snack instead of a candy bar.  Try to have fruits and/or vegetables at every meal. 
 · Make exercise part of your daily routine. You may want to start with simple activities, such as walking, bicycling, or slow swimming. Try to be active 30 to 60 minutes every day. You do not need to do all 30 to 60 minutes all at once. For example, you can exercise 3 times a day for 10 or 20 minutes. Moderate exercise is safe for most people, but it is always a good idea to talk to your doctor before starting an exercise program. 
· Keep moving. Lavelle Whittaker the lawn, work in the garden, or [employer] [place]. Take the stairs instead of the elevator at work. · If you smoke, quit. People who smoke have an increased risk for heart attack, stroke, cancer, and other lung illnesses. Quitting is hard, but there are ways to boost your chance of quitting tobacco for good. ? Use nicotine gum, patches, or lozenges. ? Ask your doctor about stop-smoking programs and medicines. ? Keep trying. In addition to reducing your risk of diseases in the future, you will notice some benefits soon after you stop using tobacco. If you have shortness of breath or asthma symptoms, they will likely get better within a few weeks after you quit. · Limit how much alcohol you drink. Moderate amounts of alcohol (up to 2 drinks a day for men, 1 drink a day for women) are okay. But drinking too much can lead to liver problems, high blood pressure, and other health problems. Family health If you have a family, there are many things you can do together to improve your health. · Eat meals together as a family as often as possible. · Eat healthy foods. This includes fruits, vegetables, lean meats and dairy, and whole grains. · Include your family in your fitness plan. Most people think of activities such as jogging or tennis as the way to fitness, but there are many ways you and your family can be more active. Anything that makes you breathe hard and gets your heart pumping is exercise. Here are some tips: ? Walk to do errands or to take your child to school or the bus. 
? Go for a family bike ride after dinner instead of watching TV. Where can you learn more? Go to http://www.gray.com/ Enter R038 in the search box to learn more about \"A Healthy Lifestyle: Care Instructions. \" Current as of: January 31, 2020               Content Version: 12.6 © 2006-2020 [employer]. Care instructions adapted under license by [employer] (which disclaims liability or warranty for this information). If you have questions about a medical condition or this instruction, always ask your healthcare professional. [address] any warranty or liability for your use of this information.

## 2021-01-22 NOTE — PROGRESS NOTES
Patient being seen today for well woman no pap. She does not have any concerns. She does see a breast specialists in [place] will request her mammogram.    1. Have you been to the ER, urgent care clinic since your last visit? Hospitalized since your last visit? No  2. Have you seen or consulted any other health care providers outside of the [address] [place] [address] since your last visit? Include any pap smears or colon screening. SHe has seen GYN and Derm    Medication reconciliation has been completed with patient. Care team discussed/updated as well as pharmacy. Health Maintenance Due   Topic Date Due    Breast Cancer Screen Mammogram  05/02/2019    Flu Vaccine (1) 09/01/2020     Health Maintenance reviewed - Has had her flu vaccine in October.

## 2021-09-21 ENCOUNTER — TELEPHONE (OUTPATIENT)
Dept: FAMILY MEDICINE CLINIC | Age: 46
End: 2021-09-21

## 2021-09-21 DIAGNOSIS — S43.402A SPRAIN OF LEFT SHOULDER, UNSPECIFIED SHOULDER SPRAIN TYPE, INITIAL ENCOUNTER: Primary | ICD-10-CM

## 2021-09-21 NOTE — TELEPHONE ENCOUNTER
Mrs. Darian Sarabia called stating that she did something to her left shoulder. She is asking for a referral to [address] [place] [address]. Nothing available with Dr. Moreno Faust until October. Consulted with Dr. Monique Bazan. He will submit referral to PT for evaluation.

## 2021-09-27 ENCOUNTER — HOSPITAL ENCOUNTER (OUTPATIENT)
Dept: PHYSICAL THERAPY | Age: 46
Discharge: HOME | End: 2021-09-27
Payer: COMMERCIAL

## 2021-09-27 PROCEDURE — 97035 APP MDLTY 1+ULTRASOUND EA 15: CPT

## 2021-09-27 PROCEDURE — 97110 THERAPEUTIC EXERCISES: CPT

## 2021-09-27 PROCEDURE — 97161 PT EVAL LOW COMPLEX 20 MIN: CPT

## 2021-09-27 NOTE — PROGRESS NOTES
[facility] at [address] [facility], [facility]  Phone: [phone]      Fax:  [phone]    Plan of Care/ Statement of Necessity for Physical Therapy Services  Patient name: Shon Barroso Start of Care: 2021   Referral source: Win Gil MD : 1975    Medical Diagnosis: Pain in left shoulder [M25.512]  Payor: [facility] [address] / Plan: [facility] [employer] [address] / Product Type: Commerical /  Onset Date: On or about 21    Treatment Diagnosis: Left shoulder Pain   Prior Hospitalization: see medical history Provider#: [id number]   Medications: Verified on Patient summary List   Comorbidities: Visual Impaired, Cateracts  Prior Level of Function: (I) and able to perform usual workout without difficulty or left shoulder pain      The Plan of Care and following information is based on the information from the initial evaluation. Assessment/ key information: Patient is a 40 y.o. female referred to PT with the above Dx. Patient seen today for left shoulder pain that has been bothering her for months. Reason for onset is unknown. Pain at the lateral posterior portion of the left shoulder. Reaching to grab seatbelt and bring it across her chest.  Weakness with working out, not able to perform left shoulder abduction to 90* with 10 #. Rest makes the pain better. Notice the pain with carrying things like groceries in the left arm.      Patient presents to PT with decreased strength, decreased flexibility, and decreased mobility of the left shoulder. Patient has axillary nerve pattern pain with left infraspinatus, posterior and lateral deltoid pain. Patient s/s appear to be consistent w/ diagnosis.   Patient demonstrates the potential to make functional gains within a reasonable time frame and will benefit from skilled PT to address impairments and improve functional mobility and strength for an improved quality of life.  Fall Risk Assessment: Patient demonstrates no Fall Risk    Evaluation Complexity History HIGH Complexity :3+ comorbidities / personal factors will impact the outcome/ POC ; Examination LOW Complexity : 1-2 Standardized tests and measures addressing body structure, function, activity limitation and / or participation in recreation  ;Presentation LOW Complexity : Stable, uncomplicated  ;Clinical Decision Making MEDIUM Complexity : FOTO score of 26-74  Overall Complexity Rating: LOW   Problem List: pain affecting function, decrease ROM, decrease strength, decrease ADL/ functional abilitiies, decrease activity tolerance, decrease flexibility/ joint mobility, decrease transfer abilities and other FOTO = 59   Treatment Plan may include any combination of the following: Therapeutic exercise, Therapeutic activities, Neuromuscular re-education, Physical agent/modality, Manual therapy, Patient education, Self Care training, Functional mobility training and Home safety training  Patient / Family readiness to learn indicated by: asking questions, trying to perform skills and interest  Persons(s) to be included in education: patient (P)  Barriers to Learning/Limitations: yes;  sensory deficits-vision/hearing/speech  Patient Goal (s): Want to regain strength and ROM. Reduce the pain  Patient Self Reported Health Status: good  Rehabilitation Potential: good    Short Term Goals: To be accomplished in 5 treatments:  1. Pt will be compliant and independent with HEP in order to facilitate PT sessions and aid with self management   Eval Status:  Initiated   Current Status:  2. Pt to tolerate 30 min or more of TE and/or Interventions w/o increased s/s   Eval Status:  Initiated   Current Status:    Long Term Goals: To be accomplished in 10 treatments:  1.   Pt will report 50% improvement or better with left shoulder dysfunction to show a significant increase in ability to tolerate ADL   Eval Status:  Initiated   Current Status:  2. Pt will report the ability to [employer] her seatbelt without difficulty to return to her PLOF     Eval Status:  Pain with donning seatbelt   Current Status:  3. Pt will demonstrate left shoulder Flx/ABD with 10# 2x10 reps with little difficulty to tolerate usual strength training program with little to no difficulty     Eval Status:  Pain with upper body workout, Not able to lift 10# into 90* Flx   Current Status:  4. Pt will demonstrate MMT left shoulder ER/ABD 4+ with little to no added pain for return to upper body workout 4-5x/week with no difficulty      Eval Status:  MMT left shoulder ER 4- and ABD 3+   Current Status:  5. Pt will improve FOTO score to 74 in 11 visits to show significant improvement for progress to good shoulder function   Eval Status: FOTO = 59   Current Status:     Frequency / Duration: Patient to be seen 2 times per week for 10 treatments. Patient/ Caregiver education and instruction: Diagnosis, prognosis, self care, activity modification and exercises   [x]  Plan of care has been reviewed with ALISA Almaguer, PT 9/27/2021 9:32 AM  _____________________________________________________________________  I certify that the above Therapy Services are being furnished while the patient is under my care. I agree with the treatment plan and certify that this therapy is necessary.     [de-identified] Signature:____________Date:_________TIME:________     Mitchell Alcantar MD  ** Signature, Date and Time must be completed for valid certification **    Please sign and return to [facility] at [address] [facility] [facility], [facility] [address]  Phone: [phone]      Fax:  [phone]

## 2021-09-27 NOTE — PROGRESS NOTES
PT DAILY TREATMENT NOTE/SHOULDER EVAL     Patient Name: Sly Moraes  Date:2021  : 1975  [x]  Patient  Verified  Payor: MEDICAL [facility] / Plan: [facility] MEDICAL M[employer]UAL [address] / Product Type: Commerical /    In WJDA:[id number]  Out time:1023  Total Treatment Time (min): 45  Visit #: 1 of 10    Medicare/BCBS Only   Total Timed Codes (min):    1:1 Treatment Time:          Treatment Area: Pain in left shoulder [M25.512]      SUBJECTIVE  Pain Level (0-10 scale): 0 increase to 4-5 with activity  []constant [x]intermittent []improving []worsening []no change since onset     Any medication changes, allergies to medications, adverse drug reactions, diagnosis change, or new procedure performed?: [x] No    [] Yes (see summary sheet for update)  Subjective functional status/changes:       Subjective: Patient is a 40 y.o. female referred to PT with the above Dx. Patient seen today for left shoulder pain that has been bothering her for months. Reason for onset is unknown. Pain at the lateral posterior portion of the left shoulder. Reaching to grab seatbelt and bring it across her chest.  Weakness with working out, not able to perform left shoulder abduction to 90* with 10 #. Rest makes the pain better. Notice the pain with carrying things like groceries in the left arm. Patient presents to PT with decreased strength, decreased flexibility, and decreased mobility of the left shoulder. Patient has axillary nerve pattern pain with left infraspinatus, posterior and lateral deltoid pain. Patient s/s appear to be consistent w/ diagnosis. Patient demonstrates the potential to make functional gains within a reasonable time frame and will benefit from skilled PT to address impairments and improve functional mobility and strength for an improved quality of life.   Fall Risk Assessment: Patient demonstrates no Fall Risk      Mechanism of Injury: Unknown    Comorbidities: Visual Impaired, Cateracts  Prior Level of Function: (I) and able to perform usual workout without difficulty or left shoulder pain    FOTO: 59 / 74 in 11 visits    Goal: Want to regain strength and ROM. 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 Exercise 4-5x per week    Limitations to Activity/Recreation/PLOF: Change in workout program, lifting in left shoulder elevation, reaching across body to put on seat belt       Barriers: [x]pain []financial []time []transportation []other    Motivation: High    FABQ Score: []low []elevate    Cognition: A & O x 3    Other:    Risk For Falls:   [x]No  []low []elevate           OBJECTIVE/EXAMINATION  - Fwd head  - Rounded shoulders  - Increased kyphosis  - Muscle tightness and Multiple painful TrPs at the left infraspinatus  - TTP left posterior deltoid         AROM PROM Strength Pain? ?    Right Left Right Left Right Left    Shoulder Flx  145 p!   5 4_    Shoulder Ext  47 p!   5 5-    Shoulder ABD  180 p!   5- 3+    Shoulder ADD  22 p!   5 5    Shoulder IR  23p!!   5 5    Shoulder ER  113   5 4-                  Modality rationale: decrease inflammation, decrease pain and increase tissue extensibility to improve the patients ability to improve daily function   Min Type Additional Details      [] Estim:  []Unatt       []IFC  []Premod                        []Other:  []w/ice   []w/heat  Position:  Location:      [] Estim: []Att    []TENS instruct  []NMES                    []Other:  []w/US   []w/ice   []w/heat  Position:  Location:      []  Traction: [] Cervical       []Lumbar                       [] Prone          []Supine                       []Intermittent   []Continuous Lbs:  [] before manual  [] after manual    8 [x]  Ultrasound: []Continuous   [] Pulsed                           []1MHz []3MHz W/cm2:  Location:      []  Iontophoresis with dexamethasone         Location: [] Take home patch   [] In clinic      []  Ice     []  heat  []  Ice massage  []  Laser   []  Anodyne Position:  Location:      []  Laser with stim  []  Other:  Position:  Location:      []  Vasopneumatic Device Pressure:       [] lo [] med [] hi   Temperature: [] lo [] med [] hi    [] Skin assessment post-treatment:  []intact []redness- no adverse reaction    []redness - adverse reaction:      20 min [x]Eval                  []Re-Eval         14 min Therapeutic Exercise:  [] See flow sheet : Develop and instruct HEP   Rationale: increase ROM, increase strength, and improve coordination to improve the patients ability to Initiate HEP and improve daily function     3 min Manual Therapy:  DTM Left Infraspinatus/lateral and npost deltoid   Rationale: decrease pain, increase ROM, increase tissue extensibility and decrease trigger points to improve daily activity                                                                   With   [x] TE   [] TA   [] neuro   [] other: Patient Education: [x] Review HEP    [] Progressed/Changed HEP based on:   [] positioning   [] body mechanics   [] transfers   [] heat/ice application    [] other:       Other Objective/Functional Measures:      Access Code: [email or web address]  URL: [email or web address]  Date: 09/27/2021  Prepared by: Mary Chapman    Exercises  Standing Overhead Triceps Stretch - 1 x daily - 7 x weekly - 10 reps - 3 sets  Standing Shoulder Posterior Capsule Stretch - 1 x daily - 7 x weekly - 10 reps - 3 sets  Standing Shoulder Scaption - 1 x daily - 7 x weekly - 10 reps - 3 sets  Seated Shoulder Flexion - 1 x daily - 7 x weekly - 10 reps - 3 sets  Seated Shoulder Horizontal Abduction - Palms Down - 1 x daily - 7 x weekly - 10 reps - 3 sets  Shoulder Overhead Press in Abduction with Dumbbells - 1 x daily - 7 x weekly - 10 reps - 3 sets  Seated Single Arm Chest Press - 1 x daily - 7 x weekly - 10 reps - 3 sets  Pec Minor Stretch - 1 x daily - 7 x weekly - 10 reps - 3 sets    Pain Level (0-10 scale) post treatment: 0     ASSESSMENT/Changes in Function:        [x]  See Plan of Care  []  See progress note/recertification  []  See Discharge Summary         Progress towards goals / Updated goals:  Short Term Goals: To be accomplished in 5 treatments:  1. Pt will be compliant and independent with HEP in order to facilitate PT sessions and aid with self management   Eval Status:  Initiated   Current Status:  2. Pt to tolerate 30 min or more of TE and/or Interventions w/o increased s/s   Eval Status:  Initiated   Current Status:    Long Term Goals: To be accomplished in 10 treatments:  1. Pt will report 50% improvement or better with left shoulder dysfunction to show a significant increase in ability to tolerate ADL   Eval Status:  Initiated   Current Status:  2. Pt will report the ability to [employer] her seatbelt without difficulty to return to her PLOF     Eval Status:  Pain with donning seatbelt   Current Status:  3. Pt will demonstrate left shoulder Flx/ABD with 10# 2x10 reps with little difficulty to tolerate usual strength training program with little to no difficulty     Eval Status:  Pain with upper body workout, Not able to lift 10# into 90* Flx   Current Status:  4. Pt will demonstrate MMT left shoulder ER/ABD 4+ with little to no added pain for return to upper body workout 4-5x/week with no difficulty      Eval Status:  MMT left shoulder ER 4- and ABD 3+   Current Status:  5.   Pt will improve FOTO score to 74 in 11 visits to show significant improvement for progress to good shoulder function   Eval Status: FOTO = 59   Current Status:      PLAN  [x]  Upgrade activities as tolerated     [x]  Continue plan of care  []  Update interventions per flow sheet       []  Discharge due to:_  []  Other:_      Marti Chase, PT 9/27/2021  9:38 AM

## 2021-09-28 ENCOUNTER — TELEPHONE (OUTPATIENT)
Dept: FAMILY MEDICINE CLINIC | Age: 46
End: 2021-09-28

## 2021-09-28 NOTE — TELEPHONE ENCOUNTER
Patient called the office and states she was [facility] but was told she will have a different therapist every time and prefers not to do that. She had a friend tell her about a PT that she used recently and had the same therapist the whole course of treatment. She would like for her referral and info to be sent to them. [facility] [phone], fax#[phone]. She can be reached at [phone] if there are any questions about this.

## 2021-10-01 ENCOUNTER — APPOINTMENT (OUTPATIENT)
Dept: PHYSICAL THERAPY | Age: 46
End: 2021-10-01

## 2021-10-07 ENCOUNTER — APPOINTMENT (OUTPATIENT)
Dept: PHYSICAL THERAPY | Age: 46
End: 2021-10-07

## 2021-10-08 ENCOUNTER — APPOINTMENT (OUTPATIENT)
Dept: PHYSICAL THERAPY | Age: 46
End: 2021-10-08

## 2021-10-11 ENCOUNTER — APPOINTMENT (OUTPATIENT)
Dept: PHYSICAL THERAPY | Age: 46
End: 2021-10-11

## 2021-10-15 ENCOUNTER — APPOINTMENT (OUTPATIENT)
Dept: PHYSICAL THERAPY | Age: 46
End: 2021-10-15

## 2021-10-15 ENCOUNTER — TELEPHONE (OUTPATIENT)
Dept: FAMILY MEDICINE CLINIC | Age: 46
End: 2021-10-15

## 2021-10-15 NOTE — TELEPHONE ENCOUNTER
Called patient to let her know I had a cancellation on Dr. Mika Jain schedule for 10/19/21 at 10 AM. Told I have move her appt up to this time and date and changed this to an in office visit. Asked that she call me back to let me know if she will be able to come in for this appt, office number has been left on her voicemail.

## 2021-10-18 ENCOUNTER — APPOINTMENT (OUTPATIENT)
Dept: PHYSICAL THERAPY | Age: 46
End: 2021-10-18

## 2021-10-18 NOTE — TELEPHONE ENCOUNTER
Called patient confirmed that she was aware her appt had been moved up to tomorrow at 10:00 AM. She says she had gotten a reminder call and has this on her calender.

## 2021-10-19 ENCOUNTER — OFFICE VISIT (OUTPATIENT)
Dept: FAMILY MEDICINE CLINIC | Age: 46
End: 2021-10-19
Payer: COMMERCIAL

## 2021-10-19 VITALS
HEIGHT: 64 IN | DIASTOLIC BLOOD PRESSURE: 70 MMHG | SYSTOLIC BLOOD PRESSURE: 100 MMHG | WEIGHT: 162.8 LBS | OXYGEN SATURATION: 98 % | TEMPERATURE: 97.3 F | BODY MASS INDEX: 27.79 KG/M2 | HEART RATE: 75 BPM | RESPIRATION RATE: 14 BRPM

## 2021-10-19 DIAGNOSIS — M25.512 ACUTE PAIN OF LEFT SHOULDER: Primary | ICD-10-CM

## 2021-10-19 PROCEDURE — 99213 OFFICE O/P EST LOW 20 MIN: CPT | Performed by: FAMILY MEDICINE

## 2021-10-19 NOTE — PROGRESS NOTES
Baldev Triana (: 1975) is a 40 y.o. female, established patient, here for:    ASSESSMENT/PLAN:  1. Acute pain of left shoulder  consistent with myofascial pain and spasm, mild impingement as reported  no evidence of neurovascular compromise  Endorse plan for PT with dry needling       Return if symptoms worsen or fail to improve. SUBJECTIVE/OBJECTIVE:  HPI  Shoulder Pain  Patient complains of left side shoulder pain. The symptoms began a couple of months ago. Posterior aspect. Symptoms were incited by no known event. Aggravated by certain activities such as reaching behind to pull seat belt across, front row weight lifting at the gym. Unchanged initially. Self referred [facility]. Muscle tightness, reduced ROM noted with recommendations for dry needling in addition to standard therapy for myofascial pain and impingement. No changes in  strength. No paresthesias  No neck pain  No remote trauma      Physical Exam  Constitutional:       General: She is not in acute distress. Appearance: She is well-developed. HENT:      Head: Normocephalic and atraumatic. Pulmonary:      Effort: Pulmonary effort is normal.   Musculoskeletal:      Left shoulder: No deformity, tenderness or bony tenderness. Normal range of motion. Left hand: Normal strength. Cervical back: Normal.   Neurological:      Mental Status: She is alert and oriented to person, place, and time. Motor: No weakness. Psychiatric:         Behavior: Behavior normal.         Thought Content:  Thought content normal.         Judgment: Judgment normal.               -- Leon Whitney MD

## 2021-10-19 NOTE — PROGRESS NOTES
Patient here for left shoulder pain she had a PT evaluation done and it is recommended she has dry needling for her pain, the order has been faxed to the office, patient needed a visit to have this signed. She has gotten her flu vaccine. 1. \"Have you been to the ER, urgent care clinic since your last visit? Hospitalized since your last visit? \" No    2. \"Have you seen or consulted any other health care providers outside of the [address] [place] [address] since your last visit? \" No     3. For patients aged 39-70: Has the patient had a colonoscopy? No     If the patient is female:    4. For patients aged 41-77: Has the patient had a mammogram within the past 2 years? Yes, HM satisfied with blue hyperlink    5. For patients aged 21-65: Has the patient had a pap smear?  Yes, HM satisfied with blue hyperlink

## 2021-10-21 ENCOUNTER — APPOINTMENT (OUTPATIENT)
Dept: PHYSICAL THERAPY | Age: 46
End: 2021-10-21

## 2021-10-25 ENCOUNTER — APPOINTMENT (OUTPATIENT)
Dept: PHYSICAL THERAPY | Age: 46
End: 2021-10-25

## 2021-10-29 ENCOUNTER — APPOINTMENT (OUTPATIENT)
Dept: PHYSICAL THERAPY | Age: 46
End: 2021-10-29

## 2021-12-13 ENCOUNTER — TELEPHONE (OUTPATIENT)
Dept: FAMILY MEDICINE CLINIC | Age: 46
End: 2021-12-13

## 2021-12-13 NOTE — TELEPHONE ENCOUNTER
Patient called wanting to know if Dr. Khloe Fortune would write a medical note stating that patient has had issues with her shoulder, so she want have to pay for her monthly membership at  the gym. she's unable to workout at this time.

## 2021-12-14 NOTE — TELEPHONE ENCOUNTER
Patient was seen for bilateral shoulder pain on 10/19/221 and referred to PT. She is asking for a letter stating she is having issues with her shoulders so she will not have to pay her monthly gym membership as she is not working out at this time.

## 2021-12-15 NOTE — TELEPHONE ENCOUNTER
Called patient to let her know her letter has been composed and she should have access to this via My chart, no answer information has been left on her VM and told to call the office if she has any issues accessing her letter.

## 2022-02-24 NOTE — PROGRESS NOTES
[facility] at [address] [place] [address] [address], [address] [place] [address] [address]  Phone: [phone]      Fax:  [phone]    Discharge Summary    Patient name: Estelita Collet Start of Care: 2021   Referral source: Claudia Beckett MD : 1975               Medical Diagnosis: Pain in left shoulder [M25.512]  Payor: [address] [place] [address] Ave [address] / Plan: [facility] [employer] [address] [place] [address] / Product Type: Commerical /  Onset Date: On or about 21               Treatment Diagnosis: Left shoulder Pain   Prior Hospitalization: see medical history Provider#: [id number]   Medications: Verified on Patient summary List   Comorbidities: Visual Impaired, Cateracts  Prior Level of Function: (I) and able to perform usual workout without difficulty or left shoulder pain      Visits from Start of Care: 1   Missed Visits: 0    Reporting Period : 21 to 21    Assessment/ Summary of Care: No goals achieved d/t early discharge. Patient decided on another facility to continue with care.      RECOMMENDATIONS:  [x]Discontinue therapy: []Patient has reached or is progressing toward set goals      [x]Patient is non-compliant or has abdicated      []Due to lack of appreciable progress towards set [address] [facility], [facility] 2022 12:43 PM  Lakshmi Beltrán., MPT

## 2022-03-15 ENCOUNTER — OFFICE VISIT (OUTPATIENT)
Dept: FAMILY MEDICINE CLINIC | Age: 47
End: 2022-03-15
Payer: COMMERCIAL

## 2022-03-15 VITALS
OXYGEN SATURATION: 99 % | RESPIRATION RATE: 10 BRPM | SYSTOLIC BLOOD PRESSURE: 122 MMHG | TEMPERATURE: 98.3 F | BODY MASS INDEX: 28 KG/M2 | WEIGHT: 164 LBS | DIASTOLIC BLOOD PRESSURE: 76 MMHG | HEIGHT: 64 IN | HEART RATE: 80 BPM

## 2022-03-15 DIAGNOSIS — Z01.419 WELL WOMAN EXAM: Primary | ICD-10-CM

## 2022-03-15 DIAGNOSIS — Z12.11 COLON CANCER SCREENING: ICD-10-CM

## 2022-03-15 PROCEDURE — 99396 PREV VISIT EST AGE 40-64: CPT | Performed by: FAMILY MEDICINE

## 2022-03-15 NOTE — PROGRESS NOTES
Patric Call is a 40 y.o. female here for Well Woman Exam    Assessment/Plan:   1. Well woman exam  2. Colon cancer screening  -     REFERRAL TO GASTROENTEROLOGY    Declined HIV and Hep C screening this year - \"next year\"         Subjective:   Being followed as high risk for breast cancer by Dr. Familia Berg annually - no malignancies  Sees Dr. Mary Gonzalez for paps - normal       Some residual discomfort left shoulder post PT with extreme extension. Not sure she wants to pursue it    Safe at work and home  No LMP recorded. Patient has had an ablation. Health Maintenance   Topic Date Due    Hepatitis C Screening  Never done    Colorectal Cancer Screening Combo  Never done    Depression Screen  01/22/2022    Breast Cancer Screen Mammogram  07/28/2022    Cervical cancer screen  01/26/2023    DTaP/Tdap/Td series (2 - Td or Tdap) 10/04/2023    Lipid Screen  01/22/2026    Flu Vaccine  Completed    COVID-19 Vaccine  Completed    Pneumococcal 0-64 years  Aged Out       Lab Results   Component Value Date/Time    Sodium 141 01/22/2021 01:31 PM    Potassium 4.0 01/22/2021 01:31 PM    Chloride 105 01/22/2021 01:31 PM    CO2 28 01/22/2021 01:31 PM    Anion gap 8 01/22/2021 01:31 PM    Glucose 101 (H) 01/22/2021 01:31 PM    BUN 16 01/22/2021 01:31 PM    Creatinine 0.84 01/22/2021 01:31 PM    BUN/Creatinine ratio 19 01/22/2021 01:31 PM    GFR est AA >60 01/22/2021 01:31 PM    GFR est non-AA >60 01/22/2021 01:31 PM    Calcium 9.2 01/22/2021 01:31 PM     Lab Results   Component Value Date/Time    Cholesterol, total 220 (H) 01/22/2021 01:31 PM    HDL Cholesterol 51 01/22/2021 01:31 PM    LDL, calculated 147.4 (H) 01/22/2021 01:31 PM    VLDL, calculated 21.6 01/22/2021 01:31 PM    Triglyceride 108 01/22/2021 01:31 PM    CHOL/HDL Ratio 4.3 01/22/2021 01:31 PM         ROS:  Feeling well. No dyspnea or chest pain on exertion. No abdominal pain, change in bowel habits, black or bloody stools.  No neurological complaints. No new or evolving skin lesions. Objective:     Visit Vitals  /76 (BP 1 Location: Left upper arm, BP Patient Position: Sitting)   Pulse 80   Temp 98.3 °F (36.8 °C) (Oral)   Resp 10   Ht 5' 4\" (1.626 m)   Wt 164 lb (74.4 kg)   SpO2 99%   BMI 28.15 kg/m²        The patient appears well, alert, oriented x 3, in no distress. NCAT sclera clear. Neck supple. No adenopathy or thyromegaly. Lungs are clear, good air entry, no wheezes, rhonchi or rales. S1 and S2 normal, no murmurs, regular rate and rhythm. Abdomen soft without tenderness, guarding, mass or organomegaly. Extremities show no edema. Neurological is without focal findings. BREAST EXAM: not examined    PELVIC EXAM: examination not indicated      Disclaimer: The patient understands our medical plan. Alternatives have been explained and offered. All questions have been addressed. She is encouraged to employ the information provided in the after visit summary, which was reviewed. She is instructed to call the clinic if she has not been notified either by phone or through [address] with the results of her tests or with an appointment plan for any referrals within 1 week(s). No news is not good news; it's no news. The patient  is to call if her condition worsens or fails to improve or if significant side effects are experienced. Aspects of this note may have been generated using voice recognition software. Despite editing, unrecognized errors may occur.

## 2022-03-15 NOTE — PATIENT INSTRUCTIONS
A Healthy Lifestyle: Care Instructions  Your Care Instructions     A healthy lifestyle can help you feel good, stay at a healthy weight, and have plenty of energy for both work and play. A healthy lifestyle is something you can share with your whole family. A healthy lifestyle also can lower your risk for serious health problems, such as high blood pressure, heart disease, and diabetes. You can follow a few steps listed below to improve your health and the health of your family. Follow-up care is a key part of your treatment and safety. Be sure to make and go to all appointments, and call your doctor if you are having problems. It's also a good idea to know your test results and keep a list of the medicines you take. How can you care for yourself at home? · Do not eat too much sugar, fat, or fast foods. You can still have dessert and treats now and then. The goal is moderation. · Start small to improve your eating habits. Pay attention to portion sizes, drink less juice and soda pop, and eat more fruits and vegetables. ? Eat a healthy amount of food. A 3-ounce serving of meat, for example, is about the size of a deck of cards. Fill the rest of your plate with vegetables and whole grains. ? Limit the amount of soda and sports drinks you have every day. Drink more water when you are thirsty. ? Eat plenty of fruits and vegetables every day. Have an apple or some carrot sticks as an afternoon snack instead of a candy bar. Try to have fruits and/or vegetables at every meal.  · Make exercise part of your daily routine. You may want to start with simple activities, such as walking, bicycling, or slow swimming. Try to be active 30 to 60 minutes every day. You do not need to do all 30 to 60 minutes all at once. For example, you can exercise 3 times a day for 10 or 20 minutes.  Moderate exercise is safe for most people, but it is always a good idea to talk to your doctor before starting an exercise program.  · Keep moving. Janny Arnold the lawn, work in the garden, or [employer]. Take the stairs instead of the elevator at work. · If you smoke, quit. People who smoke have an increased risk for heart attack, stroke, cancer, and other lung illnesses. Quitting is hard, but there are ways to boost your chance of quitting tobacco for good. ? Use nicotine gum, patches, or lozenges. ? Ask your doctor about stop-smoking programs and medicines. ? Keep trying. In addition to reducing your risk of diseases in the future, you will notice some benefits soon after you stop using tobacco. If you have shortness of breath or asthma symptoms, they will likely get better within a few weeks after you quit. · Limit how much alcohol you drink. Moderate amounts of alcohol (up to 2 drinks a day for men, 1 drink a day for women) are okay. But drinking too much can lead to liver problems, high blood pressure, and other health problems. Family health  If you have a family, there are many things you can do together to improve your health. · Eat meals together as a family as often as possible. · Eat healthy foods. This includes fruits, vegetables, lean meats and dairy, and whole grains. · Include your family in your fitness plan. Most people think of activities such as jogging or tennis as the way to fitness, but there are many ways you and your family can be more active. Anything that makes you breathe hard and gets your heart pumping is exercise. Here are some tips:  ? Walk to do errands or to take your child to school or the bus.  ? Go for a family bike ride after dinner instead of watching TV. Where can you learn more? Go to http://www.gray.com/  Enter J243 in the search box to learn more about \"A Healthy Lifestyle: Care Instructions. \"  Current as of: June 16, 2021               Content Version: 13.2  © 4836-3763 Healthwise, Incorporated.    Care instructions adapted under license by [employer] (which disclaims liability or warranty for this information). If you have questions about a medical condition or this instruction, always ask your healthcare professional. [address] any warranty or liability for your use of this information.

## 2022-03-15 NOTE — PROGRESS NOTES
Patient being seen today for her annual physical exam. She would like a referral placed for colonoscopy, no concerns. 1. \"Have you been to the ER, urgent care clinic since your last visit? Hospitalized since your last visit? \" No    2. \"Have you seen or consulted any other health care providers outside of the [address] [place] [address] since your last visit? \" Has seen her GYN     3. For patients aged 39-70: Has the patient had a colonoscopy / FIT/ Cologuard? No      If the patient is female:    4. For patients aged 41-77: Has the patient had a mammogram within the past 2 years? Yes - no Care Gap present      5. For patients aged 21-65: Has the patient had a pap smear?  Yes - no Care Gap present

## 2022-03-19 PROBLEM — Z91.89 AT HIGH RISK FOR BREAST CANCER: Status: ACTIVE | Noted: 2018-02-08

## 2022-03-19 PROBLEM — Z80.3 FAMILY HISTORY OF BREAST CANCER IN MOTHER: Status: ACTIVE | Noted: 2018-02-08

## 2022-03-20 PROBLEM — N60.29 FIBROADENOSIS OF BREAST: Status: ACTIVE | Noted: 2017-05-18

## 2022-08-02 LAB — COLONOSCOPY, EXTERNAL: NORMAL

## 2022-08-05 PROBLEM — Z86.0100 HISTORY OF COLON POLYPS: Status: ACTIVE | Noted: 2022-08-05

## 2022-08-05 PROBLEM — Z86.010 HISTORY OF COLON POLYPS: Status: ACTIVE | Noted: 2022-08-05

## 2023-02-15 ENCOUNTER — TELEPHONE (OUTPATIENT)
Facility: CLINIC | Age: 48
End: 2023-02-15

## 2023-02-15 NOTE — TELEPHONE ENCOUNTER
Pt called to request the medication CIPRO as [place] is going out of [place] country on 3[place]3. Please review and advise as soon as possible.

## 2023-02-17 NOTE — TELEPHONE ENCOUNTER
Called patient who says she will be leaving to go to [facility] [place] and it was advised by a friend to get a prescription in case she catches something while out to the country she was made aware she will need an appt with Dr. Opal Leslie for this and has been added to the schedule on 2/28 at 12:00 PM.

## 2023-02-22 ENCOUNTER — TELEPHONE (OUTPATIENT)
Facility: CLINIC | Age: 48
End: 2023-02-22

## 2023-02-22 NOTE — TELEPHONE ENCOUNTER
----- Message from Christian Diana sent at 2/21/2023 10:17 AM EST -----  Subject: Message to Provider    QUESTIONS  Information for Provider? pt will be driving during her appt on 2/28/2023    She would like to to the appt by phone call not video  Please advise   ---------------------------------------------------------------------------  --------------  [id number]  [phone]; OK to leave message on voicemail  ---------------------------------------------------------------------------  --------------  SCRIPT ANSWERS  Relationship to Patient?  Self

## 2023-02-22 NOTE — TELEPHONE ENCOUNTER
Called patient no answer left message letting patient know Dr. Long Hammond does not due telephone visit and we can reschedule her if needed or the visit has to be in office or a video. Asked her to call me back as soon as she can to let me know how she would like to proceed.

## 2023-02-22 NOTE — TELEPHONE ENCOUNTER
Patient called the office and spoke with Derrek who did notify me. He was asked to let patient know that Dr. Leida Denis does not do telephone visits and this would need to be virtual or in person patient has opted to cancel her appt.

## 2023-03-17 ENCOUNTER — OFFICE VISIT (OUTPATIENT)
Facility: CLINIC | Age: 48
End: 2023-03-17

## 2023-03-17 ENCOUNTER — HOSPITAL ENCOUNTER (OUTPATIENT)
Facility: HOSPITAL | Age: 48
Setting detail: SPECIMEN
Discharge: HOME | End: 2023-03-20

## 2023-03-17 VITALS
SYSTOLIC BLOOD PRESSURE: 108 MMHG | TEMPERATURE: 97.6 F | HEART RATE: 76 BPM | OXYGEN SATURATION: 98 % | RESPIRATION RATE: 10 BRPM | DIASTOLIC BLOOD PRESSURE: 64 MMHG | HEIGHT: 64 IN | BODY MASS INDEX: 29.02 KG/M2 | WEIGHT: 170 LBS

## 2023-03-17 DIAGNOSIS — F41.8 PERFORMANCE ANXIETY: ICD-10-CM

## 2023-03-17 DIAGNOSIS — Z11.4 SCREENING FOR HIV WITHOUT PRESENCE OF RISK FACTORS: ICD-10-CM

## 2023-03-17 DIAGNOSIS — Z00.00 ENCOUNTER FOR PREVENTIVE CARE: Primary | ICD-10-CM

## 2023-03-17 DIAGNOSIS — Z11.59 NEED FOR HEPATITIS C SCREENING TEST: ICD-10-CM

## 2023-03-17 DIAGNOSIS — Z86.010 HISTORY OF COLON POLYPS: ICD-10-CM

## 2023-03-17 LAB — LABCORP SPECIMEN COLLECTION: NORMAL

## 2023-03-17 PROCEDURE — 99001 SPECIMEN HANDLING PT-LAB: CPT

## 2023-03-17 RX ORDER — PROPRANOLOL HYDROCHLORIDE 10 MG/1
10 TABLET ORAL 3 TIMES DAILY
Qty: 30 TABLET | Refills: 3 | Status: SHIPPED | OUTPATIENT
Start: 2023-03-17

## 2023-03-17 SDOH — ECONOMIC STABILITY: INCOME INSECURITY: HOW HARD IS IT FOR YOU TO PAY FOR THE VERY BASICS LIKE FOOD, HOUSING, MEDICAL CARE, AND HEATING?: NOT HARD AT ALL

## 2023-03-17 SDOH — ECONOMIC STABILITY: FOOD INSECURITY: WITHIN THE PAST 12 MONTHS, YOU WORRIED THAT YOUR FOOD WOULD RUN OUT BEFORE YOU GOT MONEY TO BUY MORE.: NEVER TRUE

## 2023-03-17 SDOH — ECONOMIC STABILITY: HOUSING INSECURITY
IN THE LAST 12 MONTHS, WAS THERE A TIME WHEN YOU DID NOT HAVE A STEADY PLACE TO SLEEP OR SLEPT IN A SHELTER (INCLUDING NOW)?: NO

## 2023-03-17 SDOH — ECONOMIC STABILITY: FOOD INSECURITY: WITHIN THE PAST 12 MONTHS, THE FOOD YOU BOUGHT JUST DIDN'T LAST AND YOU DIDN'T HAVE MONEY TO GET MORE.: NEVER TRUE

## 2023-03-17 ASSESSMENT — PATIENT HEALTH QUESTIONNAIRE - PHQ9
7. TROUBLE CONCENTRATING ON THINGS, SUCH AS READING THE NEWSPAPER OR WATCHING TELEVISION: 0
SUM OF ALL RESPONSES TO PHQ QUESTIONS 1-9: 0
SUM OF ALL RESPONSES TO PHQ QUESTIONS 1-9: 0
5. POOR APPETITE OR OVEREATING: 0
6. FEELING BAD ABOUT YOURSELF - OR THAT YOU ARE A FAILURE OR HAVE LET YOURSELF OR YOUR FAMILY DOWN: 0
1. LITTLE INTEREST OR PLEASURE IN DOING THINGS: 0
2. FEELING DOWN, DEPRESSED OR HOPELESS: 0
9. THOUGHTS THAT YOU WOULD BE BETTER OFF DEAD, OR OF HURTING YOURSELF: 0
SUM OF ALL RESPONSES TO PHQ QUESTIONS 1-9: 0
SUM OF ALL RESPONSES TO PHQ QUESTIONS 1-9: 0
3. TROUBLE FALLING OR STAYING ASLEEP: 0
SUM OF ALL RESPONSES TO PHQ9 QUESTIONS 1 & 2: 0
8. MOVING OR SPEAKING SO SLOWLY THAT OTHER PEOPLE COULD HAVE NOTICED. OR THE OPPOSITE, BEING SO FIGETY OR RESTLESS THAT YOU HAVE BEEN MOVING AROUND A LOT MORE THAN USUAL: 0
4. FEELING TIRED OR HAVING LITTLE ENERGY: 0

## 2023-03-17 NOTE — ASSESSMENT & PLAN NOTE
8/2/22 - Dr. Darion Breaux. sessile serrated adenoma - q5. Had been on file pre cutover. Searching record.

## 2023-03-17 NOTE — Clinical Note
Tono Pelletier documented her colo results in her Probl List so we must have received it.  Please check media first step

## 2023-03-17 NOTE — PROGRESS NOTES
Chief Complaint   Patient presents with    Annual Exam     Patient here today for her annual exam. She would like to discuss getting a prescription for propanolol 10 mg to help her with public speaking. 1. \"Have you been to the ER, urgent care clinic since your last visit? Hospitalized since your last visit? \" No    2. \"Have you seen or consulted any other health care providers outside of the [address] [place] [address] since your last visit? \"  Has seen GI for colonoscopy and has seen breast spec Dr. Anibal Price. 3. For patients aged 39-70: Has the patient had a colonoscopy / FIT/ Cologuard? Yes - Care Gap present. Rooming MA/LPN to request most recent results Dr. Anastasia Baeza      If the patient is female:    4. For patients aged 41-77: Has the patient had a mammogram within the past 2 years? Yes - Care Gap present. Rooming MA/LPN to request most recent results Dr. Anibal Price      5. For patients aged 21-65: Has the patient had a pap smear? Yes - Care Gap present.  Rooming MA/LPN to request most recent results Dr. Corie De La Torre

## 2023-03-17 NOTE — PROGRESS NOTES
Tania Aiken is a 56 y.o. female here for Well Woman Exam    Assessment/Plan:   1. Encounter for preventive care  2. Screening for HIV without presence of risk factors  -     HIV 1/2 Ag/Ab, 4TH Generation,W Rflx Confirm; Future  3. Need for hepatitis C screening test  -     Hepatitis C Antibody; Future  4. History of colon polyps  Assessment & Plan:  22 - Dr. Manuelito Vazquez. sessile serrated adenoma - q5. Had been on file pre cutover. Searching record. 5. Performance anxiety  Assessment & Plan:  Previously dx'ed and successfully tx'ed with prn use of propranolol 10 mg. Such a low dose unlikely to produce hypotension with existing low normal BP. Cautioned regardless. Follow up prn    Orders:  -     propranolol (INDERAL) 10 MG tablet; Take 1 tablet by mouth 3 times daily, Disp-30 tablet, R-3Normal    Requesting pap report    Follow-up and Dispositions    Return in about 1 year (around 3/17/2024) for wellness visit. Subjective:   Preventive care:  Fam hx skin cancer - Skin exams through Dr. Godfrey Boxer hx breast cancer in mother - breast care through Dr. Sotelo Wilmar exams through Dr. Raman Gage service/issue(s) addressed same visit:   Prior hx performance anxiety with public speaking. Currently rare, but speaking events on rare occasion (). Requesting new order for propranolol 10 mg. Previously effective without side effects. No LMP recorded.   Health Maintenance   Topic Date Due    HIV screen  Never done    Hepatitis C screen  Never done    Cervical cancer screen  Never done    Colorectal Cancer Screen  Never done    COVID-19 Vaccine (4 - Booster for Moderna series) 2021    Flu vaccine (1) 2022    Depression Screen  03/15/2023    DTaP/Tdap/Td vaccine (2 - Td or Tdap) 10/04/2023    Breast cancer screen  2023    Diabetes screen  2024    Lipids  2026    Hepatitis A vaccine  Aged Out    Hib vaccine  Aged Out    Meningococcal (ACWY) vaccine  Aged Out Pneumococcal 0-64 years Vaccine  Aged Out         Lab Results   Component Value Date/Time     01/22/2021 01:31 PM    K 4.0 01/22/2021 01:31 PM     01/22/2021 01:31 PM    CO2 28 01/22/2021 01:31 PM    BUN 16 01/22/2021 01:31 PM    CREATININE 0.84 01/22/2021 01:31 PM    GLUCOSE 101 01/22/2021 01:31 PM    CALCIUM 9.2 01/22/2021 01:31 PM      No results found for: LIPASE  Lab Results   Component Value Date    CHOL 220 (H) 01/22/2021     Lab Results   Component Value Date    TRIG 108 01/22/2021     Lab Results   Component Value Date    HDL 51 01/22/2021     Lab Results   Component Value Date    LDLCALC 147.4 (H) 01/22/2021     Lab Results   Component Value Date    LABVLDL 21.6 01/22/2021     Lab Results   Component Value Date    CHOLHDLRATIO 4.3 01/22/2021           ROS:  Feeling well. No dyspnea or chest pain on exertion. No vag bleeding. No neurological complaints. No new or evolving skin lesions. Objective:     /64 (Site: Left Upper Arm, Position: Sitting)   Pulse 76   Temp 97.6 °F (36.4 °C) (Tympanic)   Resp 10   Ht 5' 4\" (1.626 m)   Wt 170 lb (77.1 kg)   SpO2 98%   BMI 29.18 kg/m²      The patient appears well, alert, oriented x 3, in no distress. NCAT sclera clear. Neck supple. No adenopathy or thyromegaly. Lungs are clear, good air entry, no wheezes, rhonchi or rales. S1 and S2 normal, no murmurs, regular rate and rhythm. Abdomen soft without tenderness, guarding, mass or organomegaly. Extremities show no edema. Neurological is without focal findings. BREAST EXAM: not examined    PELVIC EXAM: examination not indicated      Disclaimer: The patient understands our medical plan. Alternatives have been explained and offered. All questions have been addressed. She is encouraged to employ the information provided in the after visit summary, which was reviewed.       She is instructed to call the clinic if she has not been notified either by phone or through [address] with the results of her tests or with an appointment plan for any referrals within 1 week(s). No news is not good news; it's no news. The patient  is to call if her condition worsens or fails to improve or if significant side effects are experienced. Aspects of this note may have been generated using voice recognition software. Despite editing, unrecognized errors may occur.

## 2023-03-17 NOTE — ASSESSMENT & PLAN NOTE
Previously dx'ed and successfully tx'ed with prn use of propranolol 10 mg. Such a low dose unlikely to produce hypotension with existing low normal BP. Cautioned regardless.  Follow up prn

## 2023-03-17 NOTE — PATIENT INSTRUCTIONS

## 2023-03-18 LAB
HCV IGG SERPL QL IA: NON REACTIVE
HIV 1+2 AB+HIV1 P24 AG SERPL QL IA: NON REACTIVE

## 2023-07-12 ENCOUNTER — TELEPHONE (OUTPATIENT)
Facility: CLINIC | Age: 48
End: 2023-07-12

## 2023-07-12 ENCOUNTER — E-VISIT (OUTPATIENT)
Facility: CLINIC | Age: 48
End: 2023-07-12
Payer: COMMERCIAL

## 2023-07-12 DIAGNOSIS — J06.9 VIRAL URI: Primary | ICD-10-CM

## 2023-07-12 PROCEDURE — 99421 OL DIG E/M SVC 5-10 MIN: CPT | Performed by: FAMILY MEDICINE

## 2023-07-12 RX ORDER — BENZONATATE 200 MG/1
200 CAPSULE ORAL 3 TIMES DAILY PRN
Qty: 30 CAPSULE | Refills: 1 | Status: SHIPPED | OUTPATIENT
Start: 2023-07-12 | End: 2023-07-19

## 2023-07-12 RX ORDER — IPRATROPIUM BROMIDE 42 UG/1
2 SPRAY, METERED NASAL 4 TIMES DAILY
Qty: 15 ML | Refills: 1 | Status: SHIPPED | OUTPATIENT
Start: 2023-07-12

## 2023-07-12 RX ORDER — HYDROCODONE POLISTIREX AND CHLORPHENIRAMINE POLISTIREX 10; 8 MG/5ML; MG/5ML
5 SUSPENSION, EXTENDED RELEASE ORAL EVERY 12 HOURS PRN
Qty: 60 ML | Refills: 0 | Status: SHIPPED | OUTPATIENT
Start: 2023-07-12 | End: 2023-07-19

## 2023-07-12 ASSESSMENT — LIFESTYLE VARIABLES: SMOKING_STATUS: NO, I'VE NEVER SMOKED

## 2023-07-12 NOTE — TELEPHONE ENCOUNTER
Pt called into the office to request tessalon pearls for her cough. Pt states she has tested herself and she is negative for COVID. Pt will be submitting a e-visit as  is not in the office today. Please review and advise as soon as possible.

## 2023-07-12 NOTE — PROGRESS NOTES
E-Visit Note:  HPI: per patient questionnaire, this has been reviewed  Exam: n/a     ----------------------------------  1. Viral URI  -     ipratropium (ATROVENT) 0.06 % nasal spray; 2 sprays by Each Nostril route 4 times daily, Disp-15 mL, R-1Normal  -     HYDROcodone-chlorpheniramine (TUSSIONEX PENNKINETIC ER) 10-8 MG/5ML SUER; Take 5 mLs by mouth every 12 hours as needed (severe cough) for up to 7 days. Max Daily Amount: 10 mLs, Disp-60 mL, R-0Normal  -     benzonatate (TESSALON) 200 MG capsule; Take 1 capsule by mouth 3 times daily as needed for Cough, Disp-30 capsule, R-1Normal          ----------------------------------  EV1 - 5-10 minutes were spent on the digital evaluation and management of this patient.     Mary Bean MD

## 2024-03-19 ENCOUNTER — OFFICE VISIT (OUTPATIENT)
Facility: CLINIC | Age: 49
End: 2024-03-19
Payer: COMMERCIAL

## 2024-03-19 ENCOUNTER — TELEPHONE (OUTPATIENT)
Facility: CLINIC | Age: 49
End: 2024-03-19

## 2024-03-19 VITALS
TEMPERATURE: 97.5 F | SYSTOLIC BLOOD PRESSURE: 122 MMHG | DIASTOLIC BLOOD PRESSURE: 80 MMHG | HEART RATE: 87 BPM | HEIGHT: 64 IN | WEIGHT: 174.8 LBS | BODY MASS INDEX: 29.84 KG/M2 | OXYGEN SATURATION: 97 %

## 2024-03-19 DIAGNOSIS — R05.9 COUGH, UNSPECIFIED TYPE: ICD-10-CM

## 2024-03-19 DIAGNOSIS — J06.9 VIRAL URI: Primary | ICD-10-CM

## 2024-03-19 DIAGNOSIS — R09.89 CHEST CONGESTION: ICD-10-CM

## 2024-03-19 DIAGNOSIS — R09.81 HEAD CONGESTION: ICD-10-CM

## 2024-03-19 LAB
EXP DATE SOLUTION: NORMAL
EXP DATE SWAB: NORMAL
EXPIRATION DATE: NORMAL
INFLUENZA A ANTIGEN, POC: NEGATIVE
INFLUENZA B ANTIGEN, POC: NEGATIVE
LOT NUMBER POC: NORMAL
LOT NUMBER SOLUTION: NORMAL
LOT NUMBER SWAB: NORMAL
SARS-COV-2 RNA, POC: NEGATIVE
VALID INTERNAL CONTROL, POC: YES

## 2024-03-19 PROCEDURE — 99213 OFFICE O/P EST LOW 20 MIN: CPT | Performed by: FAMILY MEDICINE

## 2024-03-19 PROCEDURE — 87635 SARS-COV-2 COVID-19 AMP PRB: CPT | Performed by: FAMILY MEDICINE

## 2024-03-19 PROCEDURE — 87502 INFLUENZA DNA AMP PROBE: CPT | Performed by: FAMILY MEDICINE

## 2024-03-19 SDOH — ECONOMIC STABILITY: HOUSING INSECURITY
IN THE LAST 12 MONTHS, WAS THERE A TIME WHEN YOU DID NOT HAVE A STEADY PLACE TO SLEEP OR SLEPT IN A SHELTER (INCLUDING NOW)?: PATIENT DECLINED

## 2024-03-19 SDOH — ECONOMIC STABILITY: FOOD INSECURITY: WITHIN THE PAST 12 MONTHS, THE FOOD YOU BOUGHT JUST DIDN'T LAST AND YOU DIDN'T HAVE MONEY TO GET MORE.: PATIENT DECLINED

## 2024-03-19 SDOH — ECONOMIC STABILITY: FOOD INSECURITY: WITHIN THE PAST 12 MONTHS, YOU WORRIED THAT YOUR FOOD WOULD RUN OUT BEFORE YOU GOT MONEY TO BUY MORE.: PATIENT DECLINED

## 2024-03-19 SDOH — ECONOMIC STABILITY: INCOME INSECURITY: HOW HARD IS IT FOR YOU TO PAY FOR THE VERY BASICS LIKE FOOD, HOUSING, MEDICAL CARE, AND HEATING?: PATIENT DECLINED

## 2024-03-19 ASSESSMENT — PATIENT HEALTH QUESTIONNAIRE - PHQ9
SUM OF ALL RESPONSES TO PHQ9 QUESTIONS 1 & 2: 0
SUM OF ALL RESPONSES TO PHQ QUESTIONS 1-9: 0
1. LITTLE INTEREST OR PLEASURE IN DOING THINGS: NOT AT ALL
2. FEELING DOWN, DEPRESSED OR HOPELESS: NOT AT ALL
SUM OF ALL RESPONSES TO PHQ QUESTIONS 1-9: 0

## 2024-03-19 NOTE — PROGRESS NOTES
Rosy Corado (: 1975) is a 48 y.o. female, established patient, here for:    ASSESSMENT/PLAN:  1. Viral URI  2. Cough, unspecified type  -     AMB POC COVID-19 COV  -     AMB POC INFLUENZA A  AND B REAL-TIME RT-PCR  3. Chest congestion  -     AMB POC COVID-19 COV  -     AMB POC INFLUENZA A  AND B REAL-TIME RT-PCR  4. Head congestion  -     AMB POC COVID-19 COV  -     AMB POC INFLUENZA A  AND B REAL-TIME RT-PCR      Antibiotics not indicated  Advised updated CDC guidelines   Symptomatic management:  Declined Tessalon Perles and Atrovent nasal spray in favor of OTC agents.Specifically discussed Mucinex and Sudafed.   Follow-up as needed           SUBJECTIVE/OBJECTIVE:  Chief Complaint   Patient presents with    Cough    Head Congestion    Chest Congestion     X 2 days.       Scratchy throat started 3 days ago, then cough and congestion - head and chest, ear fullness  associated with body aches    No fever, shortness of breath or wheezing    Might be feeling a little better today    No hx lung disease    Results for orders placed or performed in visit on 24   AMB POC COVID-19 COV   Result Value Ref Range    SARS-COV-2 RNA, POC Negative     Lot number swab      EXP date swab      Lot number solution      EXP date solution      LOT NUMBER POC      EXPIRATION DATE     AMB POC INFLUENZA A  AND B REAL-TIME RT-PCR   Result Value Ref Range    Valid Internal Control, POC yes     Influenza A Antigen, POC Negative     Influenza B Antigen, POC Negative                       Physical Exam  Constitutional:       General: She is not in acute distress.     Appearance: Normal appearance.   HENT:      Head: Normocephalic and atraumatic.      Right Ear: Tympanic membrane, ear canal and external ear normal.      Left Ear: Tympanic membrane, ear canal and external ear normal.      Nose: Nose normal.      Mouth/Throat:      Mouth: Mucous membranes are moist.      Pharynx: Oropharynx is clear. No oropharyngeal

## 2024-03-19 NOTE — TELEPHONE ENCOUNTER
----- Message from Rosalina Mcclellan sent at 3/19/2024 10:24 AM EDT -----  Subject: Message to Provider    QUESTIONS  Information for Provider? Pt returned Merari's call about insurance- was   unable to get through to .   ---------------------------------------------------------------------------  --------------  CALL BACK INFO  [phone]; OK to leave message on voicemail  ---------------------------------------------------------------------------  --------------  SCRIPT ANSWERS  undefined

## 2024-03-19 NOTE — PROGRESS NOTES
\"Have you been to the ER, urgent care clinic since your last visit?  Hospitalized since your last visit?\"    NO    “Have you seen or consulted any other health care providers outside of [facility] since your last visit?”    YES - When: approximately 2 months ago.  Where and Why: GYN, annual follow up, Dermatology, annual skin exam.            Click Here for Release of Records Request

## 2024-11-15 ENCOUNTER — TELEPHONE (OUTPATIENT)
Facility: CLINIC | Age: 49
End: 2024-11-15

## 2024-11-15 NOTE — TELEPHONE ENCOUNTER
----- Message from CONCEPCION JO MA sent at 11/15/2024  9:45 AM EST -----  Regarding: FW: [facility] Appointment Request    ----- Message -----  From: Princess Anna Hernandez  Sent: 11/13/2024   3:13 PM EST  To: [facility] Clinical Staff  Subject: [facility] Appointment Request                          [facility] Appointment Request    Patient needs appointment for [facility] Appointment Type: Existing Condition Follow Up.    Patient Requested Dates(s): After Thanksgiving   Patient Requested Time:  No specific time   Provider Name: Zaida Watson MD    Reason for Appointment Request: Established Patient - Available appointments did not meet patient need . Patient would like to see her PCP for anxiety medication .  --------------------------------------------------------------------------------------------------------------------------    Relationship to Patient: Self     Call Back Information: OK to leave message on voicemail  Preferred Call Back Number: Phone [phone]

## 2024-12-17 ENCOUNTER — OFFICE VISIT (OUTPATIENT)
Facility: CLINIC | Age: 49
End: 2024-12-17

## 2024-12-17 VITALS
BODY MASS INDEX: 29.5 KG/M2 | DIASTOLIC BLOOD PRESSURE: 84 MMHG | TEMPERATURE: 98.2 F | SYSTOLIC BLOOD PRESSURE: 132 MMHG | HEIGHT: 64 IN | OXYGEN SATURATION: 94 % | WEIGHT: 172.8 LBS | HEART RATE: 90 BPM

## 2024-12-17 DIAGNOSIS — Z13.1 ENCOUNTER FOR SCREENING FOR DIABETES MELLITUS: ICD-10-CM

## 2024-12-17 DIAGNOSIS — F43.21 GRIEF REACTION: ICD-10-CM

## 2024-12-17 DIAGNOSIS — F43.23 ADJUSTMENT DISORDER WITH MIXED ANXIETY AND DEPRESSED MOOD: ICD-10-CM

## 2024-12-17 DIAGNOSIS — F41.1 GAD (GENERALIZED ANXIETY DISORDER): Primary | ICD-10-CM

## 2024-12-17 RX ORDER — SERTRALINE HYDROCHLORIDE 25 MG/1
25 TABLET, FILM COATED ORAL DAILY
Qty: 60 TABLET | Refills: 0 | Status: SHIPPED | OUTPATIENT
Start: 2024-12-17

## 2024-12-17 ASSESSMENT — ANXIETY QUESTIONNAIRES
7. FEELING AFRAID AS IF SOMETHING AWFUL MIGHT HAPPEN: MORE THAN HALF THE DAYS
6. BECOMING EASILY ANNOYED OR IRRITABLE: MORE THAN HALF THE DAYS
5. BEING SO RESTLESS THAT IT IS HARD TO SIT STILL: MORE THAN HALF THE DAYS
IF YOU CHECKED OFF ANY PROBLEMS ON THIS QUESTIONNAIRE, HOW DIFFICULT HAVE THESE PROBLEMS MADE IT FOR YOU TO DO YOUR WORK, TAKE CARE OF THINGS AT HOME, OR GET ALONG WITH OTHER PEOPLE: EXTREMELY DIFFICULT
GAD7 TOTAL SCORE: 17
1. FEELING NERVOUS, ANXIOUS, OR ON EDGE: NEARLY EVERY DAY
2. NOT BEING ABLE TO STOP OR CONTROL WORRYING: NEARLY EVERY DAY
4. TROUBLE RELAXING: MORE THAN HALF THE DAYS
3. WORRYING TOO MUCH ABOUT DIFFERENT THINGS: NEARLY EVERY DAY

## 2024-12-17 NOTE — PROGRESS NOTES
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 She states her anxiety sometimes causes her to vomit and have abdominal pain.          \"Have you been to the ER, urgent care clinic since your last visit?  Hospitalized since your last visit?\"    NO    “Have you seen or consulted any other health care providers outside our system since your last visit?”    NO

## 2024-12-17 NOTE — PROGRESS NOTES
Rosy Corado (: 1975) is a 49 y.o. 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 Background undercurrent of chronic anxiety.   Reports physical symptoms such as vomiting and insomnia.  Ruminating  No thoughts of self-harm.  - Discussed potential benefits and side effects of SSRIs, specifically sertraline  - Advised to engage in self-care activities, including reading the book \"Fierce Self-Compassion\" by Dr. Allyson Rod and practicing guided meditation using the [employer] zoila  - Prescription for sertraline 25 mg, to be taken once daily, provided  - May increase dosage to 50 mg daily if tolerated well after a few days  - follow up 2 weeks    Orders:  -     sertraline (ZOLOFT) 25 MG tablet; Take 1 tablet by mouth daily May increase to 2 tabs if desired, Disp-60 tablet, R-0Normal  Encounter for screening for diabetes mellitus        Follow-up and Dispositions    Return in about 17 days (around 1/3/2025) for anxiety follow up, (30).            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 She states her anxiety sometimes causes her to vomit and have abdominal pain.       History of Present Illness  The patient is a 49-year-old female who presents for evaluation of anxiety.    She reports experiencing significant anxiety, particularly in relation to her biannual mammogram appointments, which she perceives as potential opportunities for early detection of breast cancer. She has not previously sought pharmacological intervention for her anxiety but acknowledges that she may have benefited

## 2024-12-17 NOTE — ASSESSMENT & PLAN NOTE
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 Background undercurrent of chronic anxiety.   Reports physical symptoms such as vomiting and insomnia.  Ruminating  No thoughts of self-harm.  - Discussed potential benefits and side effects of SSRIs, specifically sertraline  - Advised to engage in self-care activities, including reading the book \"Fierce Self-Compassion\" by Dr. Allyson Rod and practicing guided meditation using the [employer] zoila  - Prescription for sertraline 25 mg, to be taken once daily, provided  - May increase dosage to 50 mg daily if tolerated well after a few days  - follow up 2 weeks

## 2025-01-21 ENCOUNTER — OFFICE VISIT (OUTPATIENT)
Facility: CLINIC | Age: 50
End: 2025-01-21

## 2025-01-21 VITALS
BODY MASS INDEX: 29.06 KG/M2 | SYSTOLIC BLOOD PRESSURE: 112 MMHG | WEIGHT: 170.2 LBS | TEMPERATURE: 97.8 F | HEIGHT: 64 IN | DIASTOLIC BLOOD PRESSURE: 60 MMHG | HEART RATE: 87 BPM | OXYGEN SATURATION: 95 %

## 2025-01-21 DIAGNOSIS — F41.1 GAD (GENERALIZED ANXIETY DISORDER): Primary | ICD-10-CM

## 2025-01-21 SDOH — ECONOMIC STABILITY: FOOD INSECURITY: WITHIN THE PAST 12 MONTHS, YOU WORRIED THAT YOUR FOOD WOULD RUN OUT BEFORE YOU GOT MONEY TO BUY MORE.: NEVER TRUE

## 2025-01-21 SDOH — ECONOMIC STABILITY: FOOD INSECURITY: WITHIN THE PAST 12 MONTHS, THE FOOD YOU BOUGHT JUST DIDN'T LAST AND YOU DIDN'T HAVE MONEY TO GET MORE.: NEVER TRUE

## 2025-01-21 ASSESSMENT — PATIENT HEALTH QUESTIONNAIRE - PHQ9
8. MOVING OR SPEAKING SO SLOWLY THAT OTHER PEOPLE COULD HAVE NOTICED. OR THE OPPOSITE, BEING SO FIGETY OR RESTLESS THAT YOU HAVE BEEN MOVING AROUND A LOT MORE THAN USUAL: NOT AT ALL
SUM OF ALL RESPONSES TO PHQ9 QUESTIONS 1 & 2: 2
SUM OF ALL RESPONSES TO PHQ QUESTIONS 1-9: 7
7. TROUBLE CONCENTRATING ON THINGS, SUCH AS READING THE NEWSPAPER OR WATCHING TELEVISION: SEVERAL DAYS
SUM OF ALL RESPONSES TO PHQ QUESTIONS 1-9: 7
1. LITTLE INTEREST OR PLEASURE IN DOING THINGS: SEVERAL DAYS
9. THOUGHTS THAT YOU WOULD BE BETTER OFF DEAD, OR OF HURTING YOURSELF: NOT AT ALL
3. TROUBLE FALLING OR STAYING ASLEEP: SEVERAL DAYS
SUM OF ALL RESPONSES TO PHQ QUESTIONS 1-9: 7
4. FEELING TIRED OR HAVING LITTLE ENERGY: SEVERAL DAYS
2. FEELING DOWN, DEPRESSED OR HOPELESS: SEVERAL DAYS
10. IF YOU CHECKED OFF ANY PROBLEMS, HOW DIFFICULT HAVE THESE PROBLEMS MADE IT FOR YOU TO DO YOUR WORK, TAKE CARE OF THINGS AT HOME, OR GET ALONG WITH OTHER PEOPLE: NOT DIFFICULT AT ALL
5. POOR APPETITE OR OVEREATING: SEVERAL DAYS
6. FEELING BAD ABOUT YOURSELF - OR THAT YOU ARE A FAILURE OR HAVE LET YOURSELF OR YOUR FAMILY DOWN: SEVERAL DAYS
SUM OF ALL RESPONSES TO PHQ QUESTIONS 1-9: 7

## 2025-01-21 ASSESSMENT — ANXIETY QUESTIONNAIRES
GAD7 TOTAL SCORE: 7
7. FEELING AFRAID AS IF SOMETHING AWFUL MIGHT HAPPEN: SEVERAL DAYS
2. NOT BEING ABLE TO STOP OR CONTROL WORRYING: SEVERAL DAYS
4. TROUBLE RELAXING: SEVERAL DAYS
5. BEING SO RESTLESS THAT IT IS HARD TO SIT STILL: SEVERAL DAYS
6. BECOMING EASILY ANNOYED OR IRRITABLE: SEVERAL DAYS
3. WORRYING TOO MUCH ABOUT DIFFERENT THINGS: SEVERAL DAYS
IF YOU CHECKED OFF ANY PROBLEMS ON THIS QUESTIONNAIRE, HOW DIFFICULT HAVE THESE PROBLEMS MADE IT FOR YOU TO DO YOUR WORK, TAKE CARE OF THINGS AT HOME, OR GET ALONG WITH OTHER PEOPLE: SOMEWHAT DIFFICULT
1. FEELING NERVOUS, ANXIOUS, OR ON EDGE: SEVERAL DAYS

## 2025-01-21 NOTE — PROGRESS NOTES
Chief Complaint   Patient presents with    Anxiety     Patient here for follow up on her anxiety. She has increased her Sertraline to 50 mg daily and states this seems to be working well for her. If she is to continue on this dose she would like a 30 day supply sent to her local pharmacy and the 90 day supply sent to mail order.        \"Have you been to the ER, urgent care clinic since your last visit?  Hospitalized since your last visit?\"    NO    “Have you seen or consulted any other health care providers outside our system since your last visit?”    NO

## 2025-01-21 NOTE — PROGRESS NOTES
Rosy Corado (: 1975) is a 49 y.o. female, established patient, here for:    ASSESSMENT/PLAN:  LINDEN (generalized anxiety disorder)  Assessment & Plan:  Significantly improved after self titration to sertraline 50 mg. Current anxiety appropriate to situational stressors. No side effects. No longer experiencing vomiting or sleep disturbances and feels generally better.    - continue sertraline 50 mg  - follow up 2 months attendant with [facility] with subsequent plan to follow  Orders:  -     sertraline (ZOLOFT) 50 MG tablet; Take 1 tablet by mouth daily, Disp-90 tablet, R-3Normal  -     sertraline (ZOLOFT) 50 MG tablet; Take 1 tablet by mouth daily, Disp-30 tablet, R-0Normal        Follow-up and Dispositions    Return in about 2 months (around 3/21/2025) for anxiety at time of physical in 2 months.              SUBJECTIVE/OBJECTIVE:  Chief Complaint   Patient presents with    Anxiety     Patient here for follow up on her anxiety. She has increased her Sertraline to 50 mg daily and states this seems to be working well for her. If she is to continue on this dose she would like a 30 day supply sent to her local pharmacy and the 90 day supply sent to mail order.       History of Present Illness  The patient presents for a follow-up of anxiety.    She reports an improvement in her anxiety symptoms following an increase in her medication dosage to 2 tablets daily, which was implemented 2 weeks after the initiation of the treatment. She has only 4 tablets left. Although she continues to experience some residual anxiety, she is no longer preoccupied with worry. Her sleep pattern has normalized, and she is not experiencing any physical manifestations of illness. She also notes a significant reduction in her anxiety levels, as evidenced by her ability to travel on the highway with her children without experiencing heightened anxiety. She has been dealing with situational stressors, including issues related to her

## 2025-01-22 NOTE — ASSESSMENT & PLAN NOTE
Significantly improved after self titration to sertraline 50 mg. Current anxiety appropriate to situational stressors. No side effects. No longer experiencing vomiting or sleep disturbances and feels generally better.    - continue sertraline 50 mg  - follow up 2 months attendant with [facility] with subsequent plan to follow

## 2025-01-24 ENCOUNTER — TELEPHONE (OUTPATIENT)
Facility: CLINIC | Age: 50
End: 2025-01-24

## 2025-01-24 NOTE — TELEPHONE ENCOUNTER
Received fax from [employer] requesting prescription change. Sertraline is not covered by patients insurance. Preferred drug is Escitalopram, and  Paroxetine. Please review and advise.

## 2025-01-27 NOTE — TELEPHONE ENCOUNTER
Spoke with patient who states she used a GoodRx card for her prescription at [facility]. She spoke with her insurance company who told her the medication was covered through her mail order pharmacy but not at the local retail pharmacy. She has gotten a message from her mail order pharmacy this has been shipped out to her.

## 2025-02-24 ENCOUNTER — E-VISIT (OUTPATIENT)
Facility: CLINIC | Age: 50
End: 2025-02-24
Payer: COMMERCIAL

## 2025-02-24 DIAGNOSIS — B02.9 HERPES ZOSTER WITHOUT COMPLICATION: Primary | ICD-10-CM

## 2025-02-24 PROCEDURE — 99421 OL DIG E/M SVC 5-10 MIN: CPT | Performed by: FAMILY MEDICINE

## 2025-02-24 RX ORDER — VALACYCLOVIR HYDROCHLORIDE 1 G/1
1000 TABLET, FILM COATED ORAL 3 TIMES DAILY
Qty: 21 TABLET | Refills: 0 | Status: SHIPPED | OUTPATIENT
Start: 2025-02-24 | End: 2025-03-03

## 2025-02-24 NOTE — PROGRESS NOTES
HPI: as per patient provided history  Exam: N/A (electronic visit)  ASSESSMENT/PLAN:  1. Herpes zoster without complication    - valACYclovir (VALTREX) 1 g tablet; Take 1 tablet by mouth 3 times daily for 7 days  Dispense: 21 tablet; Refill: 0       Patient instructed to call the office if worsens, or fails to improve as anticipated.    5-10 minutes were spent on the digital evaluation and management of this patient.

## 2025-04-11 ENCOUNTER — HOSPITAL ENCOUNTER (OUTPATIENT)
Facility: HOSPITAL | Age: 50
Setting detail: SPECIMEN
Discharge: HOME | End: 2025-04-14

## 2025-04-11 ENCOUNTER — OFFICE VISIT (OUTPATIENT)
Facility: CLINIC | Age: 50
End: 2025-04-11
Payer: COMMERCIAL

## 2025-04-11 VITALS
BODY MASS INDEX: 29.6 KG/M2 | TEMPERATURE: 98.6 F | HEART RATE: 85 BPM | OXYGEN SATURATION: 98 % | HEIGHT: 64 IN | SYSTOLIC BLOOD PRESSURE: 108 MMHG | DIASTOLIC BLOOD PRESSURE: 70 MMHG | WEIGHT: 173.4 LBS

## 2025-04-11 DIAGNOSIS — Z01.419 WELL WOMAN EXAM: Primary | ICD-10-CM

## 2025-04-11 DIAGNOSIS — Z13.220 SCREENING, LIPID: ICD-10-CM

## 2025-04-11 DIAGNOSIS — N95.1 PERIMENOPAUSAL: ICD-10-CM

## 2025-04-11 DIAGNOSIS — Z23 ENCOUNTER FOR IMMUNIZATION: ICD-10-CM

## 2025-04-11 DIAGNOSIS — Z13.1 SCREENING FOR DIABETES MELLITUS: ICD-10-CM

## 2025-04-11 DIAGNOSIS — Z13.820 SCREENING FOR OSTEOPOROSIS: ICD-10-CM

## 2025-04-11 DIAGNOSIS — F41.1 GAD (GENERALIZED ANXIETY DISORDER): ICD-10-CM

## 2025-04-11 LAB — LABCORP SPECIMEN COLLECTION: NORMAL

## 2025-04-11 PROCEDURE — 90746 HEPB VACCINE 3 DOSE ADULT IM: CPT | Performed by: FAMILY MEDICINE

## 2025-04-11 PROCEDURE — 99396 PREV VISIT EST AGE 40-64: CPT | Performed by: FAMILY MEDICINE

## 2025-04-11 PROCEDURE — 99001 SPECIMEN HANDLING PT-LAB: CPT

## 2025-04-11 PROCEDURE — 90472 IMMUNIZATION ADMIN EACH ADD: CPT | Performed by: FAMILY MEDICINE

## 2025-04-11 PROCEDURE — 90715 TDAP VACCINE 7 YRS/> IM: CPT | Performed by: FAMILY MEDICINE

## 2025-04-11 PROCEDURE — 90471 IMMUNIZATION ADMIN: CPT | Performed by: FAMILY MEDICINE

## 2025-04-11 NOTE — PROGRESS NOTES
Chief Complaint   Patient presents with    Annual Exam     Patient states she had shingles at the end of February and would like to know when she will be able to get the Shingles vaccine.          \"Have you been to the ER, urgent care clinic since your last visit?  Hospitalized since your last visit?\"    NO    “Have you seen or consulted any other health care providers outside our system since your last visit?”    YES - When: approximately 3 months ago.  Where and Why: GYN, Dr. Morales.             
from sertraline 50 mg, prescribed in 2025, are noted. Severe anxiety symptoms such as nausea or waking up at night have decreased. A stressful event in 2025 was managed calmly.    Cholesterol levels have not been checked recently, and a diabetes screening is due. No chest pain or shortness of breath is reported. A lapse in healthy eating and exercise habits over the past few years is acknowledged, attributed to stress, resulting in weight gain. Three COVID-19 vaccines have been received, with plans for another dose in 2025.    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 Mammograms, ultrasounds, MRIs, or combinations thereof are performed every 6 months. Pap smears are conducted by Dr. Myra Morales, with the last visit in 2025. Annual skin checks are performed by Dr. Vic Sánchez. A colonoscopy was performed by Dr. Varela in 2022, with a recommendation to return in 5 years.    An endometrial ablation was performed by Dr. Yoana Zaragoza around  due to heavy menstrual periods, resulting in no periods since then. A  was performed in , and an appendectomy in . A core needle biopsy of a lump in the left breast revealed fibroadenoma cells, with a marker placed in the left breast. No bone density test has been conducted, and calcium or multivitamins are not taken.    Perimenopause is suspected due to a decrease in frequent breast tenderness, now occurring only twice a year. Occasional hot flashes and sweating at night, including one episode requiring a change of pajamas, are reported. No heart palpitations are experienced.        FAMILY HISTORY  Her mother has a history of breast cancer. Her grandmother might have had precancerous polyps. Her father passed away in a car accident when he was 20 years old. She has a half-brother with no known medical problems.    Lab Results

## 2025-04-11 NOTE — PATIENT INSTRUCTIONS
Adrienne Topete MD - social media and book The New Menopause  Abigail Wise MD - social media    Other book Estrogen Matters    North [employer] Menopause Society (NAMS) website

## 2025-04-11 NOTE — ASSESSMENT & PLAN NOTE
Tracking breast tenderness as post endometrial ablation. Most recent episode March 2025  - Exhibits symptoms including occasional hot flashes, insomnia, anxiety, and breast tenderness twice a year.  - Advised to monitor symptoms closely and maintain a record of changes.  - Educational references for menopause   - Schedule as desired

## 2025-04-12 LAB
ALBUMIN SERPL-MCNC: 5 G/DL (ref 3.9–4.9)
ALP SERPL-CCNC: 88 IU/L (ref 44–121)
ALT SERPL-CCNC: 13 IU/L (ref 0–32)
AST SERPL-CCNC: 16 IU/L (ref 0–40)
BASOPHILS # BLD AUTO: 0.1 X10E3/UL (ref 0–0.2)
BASOPHILS NFR BLD AUTO: 1 %
BILIRUB SERPL-MCNC: 0.4 MG/DL (ref 0–1.2)
BUN SERPL-MCNC: 12 MG/DL (ref 6–24)
BUN/CREAT SERPL: 15 (ref 9–23)
CALCIUM SERPL-MCNC: 10.6 MG/DL (ref 8.7–10.2)
CHLORIDE SERPL-SCNC: 100 MMOL/L (ref 96–106)
CHOLEST SERPL-MCNC: 294 MG/DL (ref 100–199)
CO2 SERPL-SCNC: 22 MMOL/L (ref 20–29)
CREAT SERPL-MCNC: 0.82 MG/DL (ref 0.57–1)
EGFRCR SERPLBLD CKD-EPI 2021: 88 ML/MIN/1.73
EOSINOPHIL # BLD AUTO: 0.1 X10E3/UL (ref 0–0.4)
EOSINOPHIL NFR BLD AUTO: 1 %
ERYTHROCYTE [DISTWIDTH] IN BLOOD BY AUTOMATED COUNT: 13 % (ref 11.7–15.4)
GLOBULIN SER CALC-MCNC: 2.8 G/DL (ref 1.5–4.5)
GLUCOSE SERPL-MCNC: 90 MG/DL (ref 70–99)
HBA1C MFR BLD: 5.8 % (ref 4.8–5.6)
HCT VFR BLD AUTO: 42.3 % (ref 34–46.6)
HDLC SERPL-MCNC: 68 MG/DL
HGB BLD-MCNC: 13.9 G/DL (ref 11.1–15.9)
IMM GRANULOCYTES # BLD AUTO: 0 X10E3/UL (ref 0–0.1)
IMM GRANULOCYTES NFR BLD AUTO: 0 %
LDLC SERPL CALC-MCNC: 206 MG/DL (ref 0–99)
LYMPHOCYTES # BLD AUTO: 2 X10E3/UL (ref 0.7–3.1)
LYMPHOCYTES NFR BLD AUTO: 27 %
Lab: ABNORMAL
MCH RBC QN AUTO: 29.9 PG (ref 26.6–33)
MCHC RBC AUTO-ENTMCNC: 32.9 G/DL (ref 31.5–35.7)
MCV RBC AUTO: 91 FL (ref 79–97)
MONOCYTES # BLD AUTO: 0.6 X10E3/UL (ref 0.1–0.9)
MONOCYTES NFR BLD AUTO: 8 %
NEUTROPHILS # BLD AUTO: 4.9 X10E3/UL (ref 1.4–7)
NEUTROPHILS NFR BLD AUTO: 63 %
PLATELET # BLD AUTO: 331 X10E3/UL (ref 150–450)
POTASSIUM SERPL-SCNC: 4.9 MMOL/L (ref 3.5–5.2)
PROT SERPL-MCNC: 7.8 G/DL (ref 6–8.5)
RBC # BLD AUTO: 4.65 X10E6/UL (ref 3.77–5.28)
SODIUM SERPL-SCNC: 140 MMOL/L (ref 134–144)
TRIGL SERPL-MCNC: 113 MG/DL (ref 0–149)
VLDLC SERPL CALC-MCNC: 20 MG/DL (ref 5–40)
WBC # BLD AUTO: 7.7 X10E3/UL (ref 3.4–10.8)

## 2025-04-15 ENCOUNTER — RESULTS FOLLOW-UP (OUTPATIENT)
Facility: CLINIC | Age: 50
End: 2025-04-15

## 2025-04-15 NOTE — RESULT ENCOUNTER NOTE
Ack - misread your chart (tiny font) You next scheduled appointment with me is in 2026. Please come see me to address lab findings: LDL >206, A1c consistent with prediabetes. LORA

## 2025-04-18 ENCOUNTER — OFFICE VISIT (OUTPATIENT)
Facility: CLINIC | Age: 50
End: 2025-04-18
Payer: COMMERCIAL

## 2025-04-18 VITALS
HEART RATE: 77 BPM | BODY MASS INDEX: 29.57 KG/M2 | OXYGEN SATURATION: 98 % | DIASTOLIC BLOOD PRESSURE: 70 MMHG | WEIGHT: 173.2 LBS | HEIGHT: 64 IN | TEMPERATURE: 98 F | SYSTOLIC BLOOD PRESSURE: 114 MMHG

## 2025-04-18 DIAGNOSIS — E78.00 HYPERCHOLESTEROLEMIA WITH LDL GREATER THAN 190 MG/DL: Primary | ICD-10-CM

## 2025-04-18 PROCEDURE — 99214 OFFICE O/P EST MOD 30 MIN: CPT | Performed by: FAMILY MEDICINE

## 2025-04-18 RX ORDER — ROSUVASTATIN CALCIUM 20 MG/1
20 TABLET, COATED ORAL DAILY
Qty: 90 TABLET | Refills: 3 | Status: SHIPPED | OUTPATIENT
Start: 2025-04-18

## 2025-04-18 NOTE — ASSESSMENT & PLAN NOTE
. New. Uncontrolled.  - rosuvastatin 20 mg  - Discussed potential side effects, including muscle aches - see me if occur  - therapeutic lifestyle interventions   - reassess 3 months, labs prior

## 2025-04-18 NOTE — PROGRESS NOTES
Chief Complaint   Patient presents with    Results     Patient here for follow up on her lab results.          \"Have you been to the ER, urgent care clinic since your last visit?  Hospitalized since your last visit?\"    NO    “Have you seen or consulted any other health care providers outside our system since your last visit?”    NO             
   GLUCOSE 90 04/11/2025    CALCIUM 10.6 (H) 04/11/2025    BILITOT 0.4 04/11/2025    ALKPHOS 88 04/11/2025    AST 16 04/11/2025    ALT 13 04/11/2025    LABGLOM 88 04/11/2025    GFRAA >60 01/22/2021     Hemoglobin A1C   Date Value Ref Range Status   04/11/2025 5.8 (H) 4.8 - 5.6 % Final     Comment:                 Prediabetes: 5.7 - 6.4           Diabetes: >6.4           Glycemic control for adults with diabetes: <7.0       Lab Results   Component Value Date    WBC 7.7 04/11/2025    HGB 13.9 04/11/2025    HCT 42.3 04/11/2025    MCV 91 04/11/2025     04/11/2025           Current Outpatient Medications   Medication Sig Dispense Refill    rosuvastatin (CRESTOR) 20 MG tablet Take 1 tablet by mouth daily 90 tablet 3    sertraline (ZOLOFT) 50 MG tablet Take 1 tablet by mouth daily 90 tablet 3     No current facility-administered medications for this visit.            /70 (BP Site: Left Upper Arm, Patient Position: Sitting)   Pulse 77   Temp 98 °F (36.7 °C) (Tympanic)   Ht 1.626 m (5' 4\")   Wt 78.6 kg (173 lb 3.2 oz)   SpO2 98%   BMI 29.73 kg/m²    Physical Exam  Constitutional:       General: She is not in acute distress.     Appearance: Normal appearance.   HENT:      Head: Normocephalic and atraumatic.   Pulmonary:      Effort: Pulmonary effort is normal.   Neurological:      Mental Status: She is alert and oriented to person, place, and time.           The patient (or guardian, if applicable) and other individuals in attendance with the patient were advised that Artificial Intelligence will be utilized during this visit to record and process the conversation to generate a clinical note. The patient (or guardian, if applicable) and other individuals in attendance at the appointment consented to the use of AI, including the recording.    -- Zaida Grady MD

## 2025-05-29 ENCOUNTER — HOSPITAL ENCOUNTER (OUTPATIENT)
Facility: HOSPITAL | Age: 50
Discharge: HOME | End: 2025-05-29
Attending: FAMILY MEDICINE
Payer: COMMERCIAL

## 2025-05-29 DIAGNOSIS — Z13.820 SCREENING FOR OSTEOPOROSIS: ICD-10-CM

## 2025-05-29 PROCEDURE — 77080 DXA BONE DENSITY AXIAL: CPT
